# Patient Record
Sex: FEMALE | Race: WHITE | NOT HISPANIC OR LATINO | Employment: OTHER | URBAN - METROPOLITAN AREA
[De-identification: names, ages, dates, MRNs, and addresses within clinical notes are randomized per-mention and may not be internally consistent; named-entity substitution may affect disease eponyms.]

---

## 2017-01-11 ENCOUNTER — OFFICE VISIT - HEALTHEAST (OUTPATIENT)
Dept: RHEUMATOLOGY | Facility: CLINIC | Age: 77
End: 2017-01-11

## 2017-01-11 DIAGNOSIS — M15.9 GENERALIZED OSTEOARTHROSIS OF HAND: ICD-10-CM

## 2017-01-11 DIAGNOSIS — M25.50 PAIN IN JOINT, MULTIPLE SITES: ICD-10-CM

## 2017-01-11 DIAGNOSIS — M47.817 LUMBOSACRAL SPONDYLOSIS WITHOUT MYELOPATHY: ICD-10-CM

## 2017-05-11 ENCOUNTER — COMMUNICATION - HEALTHEAST (OUTPATIENT)
Dept: RHEUMATOLOGY | Facility: CLINIC | Age: 77
End: 2017-05-11

## 2017-05-11 DIAGNOSIS — M25.50 PAIN IN JOINT, MULTIPLE SITES: ICD-10-CM

## 2017-05-11 DIAGNOSIS — M47.817 LUMBOSACRAL SPONDYLOSIS WITHOUT MYELOPATHY: ICD-10-CM

## 2017-08-10 ENCOUNTER — COMMUNICATION - HEALTHEAST (OUTPATIENT)
Dept: RHEUMATOLOGY | Facility: CLINIC | Age: 77
End: 2017-08-10

## 2017-08-10 DIAGNOSIS — M25.50 PAIN IN JOINT, MULTIPLE SITES: ICD-10-CM

## 2017-08-10 DIAGNOSIS — M47.817 LUMBOSACRAL SPONDYLOSIS WITHOUT MYELOPATHY: ICD-10-CM

## 2017-09-01 ENCOUNTER — OFFICE VISIT - HEALTHEAST (OUTPATIENT)
Dept: RHEUMATOLOGY | Facility: CLINIC | Age: 77
End: 2017-09-01

## 2017-09-01 DIAGNOSIS — M15.9 GENERALIZED OSTEOARTHROSIS OF HAND: ICD-10-CM

## 2017-09-01 DIAGNOSIS — M47.817 LUMBOSACRAL SPONDYLOSIS WITHOUT MYELOPATHY: ICD-10-CM

## 2017-09-01 DIAGNOSIS — M25.50 PAIN IN JOINT, MULTIPLE SITES: ICD-10-CM

## 2017-09-01 ASSESSMENT — MIFFLIN-ST. JEOR: SCORE: 1150.91

## 2017-10-04 ENCOUNTER — RECORDS - HEALTHEAST (OUTPATIENT)
Dept: LAB | Facility: CLINIC | Age: 77
End: 2017-10-04

## 2017-10-04 LAB
CHOLEST SERPL-MCNC: 123 MG/DL
FASTING STATUS PATIENT QL REPORTED: ABNORMAL
HDLC SERPL-MCNC: 49 MG/DL
LDLC SERPL CALC-MCNC: 55 MG/DL
TRIGL SERPL-MCNC: 93 MG/DL

## 2017-12-20 ENCOUNTER — COMMUNICATION - HEALTHEAST (OUTPATIENT)
Dept: RHEUMATOLOGY | Facility: CLINIC | Age: 77
End: 2017-12-20

## 2017-12-20 DIAGNOSIS — M47.817 LUMBOSACRAL SPONDYLOSIS WITHOUT MYELOPATHY: ICD-10-CM

## 2017-12-20 DIAGNOSIS — M25.50 PAIN IN JOINT, MULTIPLE SITES: ICD-10-CM

## 2018-03-01 ENCOUNTER — OFFICE VISIT - HEALTHEAST (OUTPATIENT)
Dept: RHEUMATOLOGY | Facility: CLINIC | Age: 78
End: 2018-03-01

## 2018-03-01 DIAGNOSIS — M47.817 LUMBOSACRAL SPONDYLOSIS WITHOUT MYELOPATHY: ICD-10-CM

## 2018-03-01 DIAGNOSIS — M25.50 PAIN IN JOINT, MULTIPLE SITES: ICD-10-CM

## 2018-03-01 DIAGNOSIS — M15.9 GENERALIZED OSTEOARTHROSIS OF HAND: ICD-10-CM

## 2018-03-01 ASSESSMENT — MIFFLIN-ST. JEOR: SCORE: 1150.91

## 2018-05-29 ENCOUNTER — COMMUNICATION - HEALTHEAST (OUTPATIENT)
Dept: RHEUMATOLOGY | Facility: CLINIC | Age: 78
End: 2018-05-29

## 2018-05-29 DIAGNOSIS — M47.817 LUMBOSACRAL SPONDYLOSIS WITHOUT MYELOPATHY: ICD-10-CM

## 2018-05-29 DIAGNOSIS — M25.50 PAIN IN JOINT, MULTIPLE SITES: ICD-10-CM

## 2018-09-10 ENCOUNTER — RECORDS - HEALTHEAST (OUTPATIENT)
Dept: LAB | Facility: CLINIC | Age: 78
End: 2018-09-10

## 2018-09-11 LAB — BACTERIA SPEC CULT: NO GROWTH

## 2018-11-26 ENCOUNTER — OFFICE VISIT - HEALTHEAST (OUTPATIENT)
Dept: RHEUMATOLOGY | Facility: CLINIC | Age: 78
End: 2018-11-26

## 2018-11-26 DIAGNOSIS — M25.50 PAIN IN JOINT, MULTIPLE SITES: ICD-10-CM

## 2018-11-26 DIAGNOSIS — M47.817 LUMBOSACRAL SPONDYLOSIS WITHOUT MYELOPATHY: ICD-10-CM

## 2018-11-26 DIAGNOSIS — M15.9 GENERALIZED OSTEOARTHROSIS OF HAND: ICD-10-CM

## 2018-11-26 LAB
ALBUMIN SERPL-MCNC: 3.4 G/DL (ref 3.5–5)
ALT SERPL W P-5'-P-CCNC: 20 U/L (ref 0–45)
CREAT SERPL-MCNC: 0.6 MG/DL (ref 0.6–1.1)
ERYTHROCYTE [DISTWIDTH] IN BLOOD BY AUTOMATED COUNT: 10.8 % (ref 11–14.5)
GFR SERPL CREATININE-BSD FRML MDRD: >60 ML/MIN/1.73M2
HCT VFR BLD AUTO: 43.4 % (ref 35–47)
HGB BLD-MCNC: 14.9 G/DL (ref 12–16)
MCH RBC QN AUTO: 33 PG (ref 27–34)
MCHC RBC AUTO-ENTMCNC: 34.4 G/DL (ref 32–36)
MCV RBC AUTO: 96 FL (ref 80–100)
PLATELET # BLD AUTO: 260 THOU/UL (ref 140–440)
PMV BLD AUTO: 7 FL (ref 7–10)
RBC # BLD AUTO: 4.52 MILL/UL (ref 3.8–5.4)
WBC: 7.2 THOU/UL (ref 4–11)

## 2019-03-06 ENCOUNTER — RECORDS - HEALTHEAST (OUTPATIENT)
Dept: LAB | Facility: CLINIC | Age: 79
End: 2019-03-06

## 2019-03-06 LAB
ANION GAP SERPL CALCULATED.3IONS-SCNC: 8 MMOL/L (ref 5–18)
BUN SERPL-MCNC: 13 MG/DL (ref 8–28)
CALCIUM SERPL-MCNC: 10.4 MG/DL (ref 8.5–10.5)
CHLORIDE BLD-SCNC: 101 MMOL/L (ref 98–107)
CHOLEST SERPL-MCNC: 132 MG/DL
CO2 SERPL-SCNC: 28 MMOL/L (ref 22–31)
CREAT SERPL-MCNC: 0.64 MG/DL (ref 0.6–1.1)
FASTING STATUS PATIENT QL REPORTED: NORMAL
GFR SERPL CREATININE-BSD FRML MDRD: >60 ML/MIN/1.73M2
GLUCOSE BLD-MCNC: 77 MG/DL (ref 70–125)
HDLC SERPL-MCNC: 56 MG/DL
LDLC SERPL CALC-MCNC: 53 MG/DL
POTASSIUM BLD-SCNC: 4.7 MMOL/L (ref 3.5–5)
SODIUM SERPL-SCNC: 137 MMOL/L (ref 136–145)
TRIGL SERPL-MCNC: 115 MG/DL

## 2019-05-10 ENCOUNTER — COMMUNICATION - HEALTHEAST (OUTPATIENT)
Dept: RHEUMATOLOGY | Facility: CLINIC | Age: 79
End: 2019-05-10

## 2019-05-10 DIAGNOSIS — M47.817 LUMBOSACRAL SPONDYLOSIS WITHOUT MYELOPATHY: ICD-10-CM

## 2019-05-10 DIAGNOSIS — M25.50 PAIN IN JOINT, MULTIPLE SITES: ICD-10-CM

## 2019-05-28 ENCOUNTER — OFFICE VISIT - HEALTHEAST (OUTPATIENT)
Dept: RHEUMATOLOGY | Facility: CLINIC | Age: 79
End: 2019-05-28

## 2019-05-28 DIAGNOSIS — M25.50 PAIN IN JOINT, MULTIPLE SITES: ICD-10-CM

## 2019-05-28 DIAGNOSIS — M47.817 LUMBOSACRAL SPONDYLOSIS WITHOUT MYELOPATHY: ICD-10-CM

## 2019-05-28 LAB
ALBUMIN SERPL-MCNC: 3.6 G/DL (ref 3.5–5)
ALT SERPL W P-5'-P-CCNC: 23 U/L (ref 0–45)
CREAT SERPL-MCNC: 0.64 MG/DL (ref 0.6–1.1)
ERYTHROCYTE [DISTWIDTH] IN BLOOD BY AUTOMATED COUNT: 10.3 % (ref 11–14.5)
GFR SERPL CREATININE-BSD FRML MDRD: >60 ML/MIN/1.73M2
HCT VFR BLD AUTO: 43.4 % (ref 35–47)
HGB BLD-MCNC: 15 G/DL (ref 12–16)
MCH RBC QN AUTO: 33.6 PG (ref 27–34)
MCHC RBC AUTO-ENTMCNC: 34.5 G/DL (ref 32–36)
MCV RBC AUTO: 97 FL (ref 80–100)
PLATELET # BLD AUTO: 272 THOU/UL (ref 140–440)
PMV BLD AUTO: 7.8 FL (ref 7–10)
RBC # BLD AUTO: 4.45 MILL/UL (ref 3.8–5.4)
WBC: 7.1 THOU/UL (ref 4–11)

## 2019-05-28 ASSESSMENT — MIFFLIN-ST. JEOR: SCORE: 1127.33

## 2019-08-12 ENCOUNTER — COMMUNICATION - HEALTHEAST (OUTPATIENT)
Dept: RHEUMATOLOGY | Facility: CLINIC | Age: 79
End: 2019-08-12

## 2019-08-12 DIAGNOSIS — M47.817 LUMBOSACRAL SPONDYLOSIS WITHOUT MYELOPATHY: ICD-10-CM

## 2019-08-12 DIAGNOSIS — M25.50 PAIN IN JOINT, MULTIPLE SITES: ICD-10-CM

## 2019-11-06 ENCOUNTER — COMMUNICATION - HEALTHEAST (OUTPATIENT)
Dept: RHEUMATOLOGY | Facility: CLINIC | Age: 79
End: 2019-11-06

## 2019-11-06 DIAGNOSIS — M25.50 PAIN IN JOINT, MULTIPLE SITES: ICD-10-CM

## 2019-11-06 DIAGNOSIS — M47.817 LUMBOSACRAL SPONDYLOSIS WITHOUT MYELOPATHY: ICD-10-CM

## 2019-12-16 ENCOUNTER — COMMUNICATION - HEALTHEAST (OUTPATIENT)
Dept: RHEUMATOLOGY | Facility: CLINIC | Age: 79
End: 2019-12-16

## 2019-12-16 DIAGNOSIS — M47.817 LUMBOSACRAL SPONDYLOSIS WITHOUT MYELOPATHY: ICD-10-CM

## 2019-12-16 DIAGNOSIS — M25.50 PAIN IN JOINT, MULTIPLE SITES: ICD-10-CM

## 2020-01-27 ENCOUNTER — OFFICE VISIT - HEALTHEAST (OUTPATIENT)
Dept: RHEUMATOLOGY | Facility: CLINIC | Age: 80
End: 2020-01-27

## 2020-01-27 DIAGNOSIS — M47.817 LUMBOSACRAL SPONDYLOSIS WITHOUT MYELOPATHY: ICD-10-CM

## 2020-01-27 DIAGNOSIS — M25.50 PAIN IN JOINT, MULTIPLE SITES: ICD-10-CM

## 2020-01-27 LAB
CREAT SERPL-MCNC: 0.65 MG/DL (ref 0.6–1.1)
GFR SERPL CREATININE-BSD FRML MDRD: >60 ML/MIN/1.73M2

## 2020-01-27 RX ORDER — SODIUM FLUORIDE 1.1 G/100G
GEL ORAL
Refills: 3 | Status: SHIPPED | COMMUNITY
Start: 2019-11-26

## 2020-01-27 RX ORDER — LOSARTAN POTASSIUM 25 MG/1
TABLET ORAL
Status: SHIPPED | COMMUNITY
Start: 2020-01-11 | End: 2024-03-19

## 2020-01-27 RX ORDER — MOMETASONE FUROATE MONOHYDRATE 50 UG/1
SPRAY, METERED NASAL
Status: SHIPPED | COMMUNITY
Start: 2020-01-07

## 2020-03-19 ENCOUNTER — RECORDS - HEALTHEAST (OUTPATIENT)
Dept: LAB | Facility: CLINIC | Age: 80
End: 2020-03-19

## 2020-03-19 LAB
ANION GAP SERPL CALCULATED.3IONS-SCNC: 8 MMOL/L (ref 5–18)
BUN SERPL-MCNC: 14 MG/DL (ref 8–28)
CALCIUM SERPL-MCNC: 9.4 MG/DL (ref 8.5–10.5)
CHLORIDE BLD-SCNC: 98 MMOL/L (ref 98–107)
CO2 SERPL-SCNC: 29 MMOL/L (ref 22–31)
CREAT SERPL-MCNC: 0.68 MG/DL (ref 0.6–1.1)
GFR SERPL CREATININE-BSD FRML MDRD: >60 ML/MIN/1.73M2
GLUCOSE BLD-MCNC: 58 MG/DL (ref 70–125)
POTASSIUM BLD-SCNC: 4.4 MMOL/L (ref 3.5–5)
SODIUM SERPL-SCNC: 135 MMOL/L (ref 136–145)

## 2020-05-05 ENCOUNTER — RECORDS - HEALTHEAST (OUTPATIENT)
Dept: LAB | Facility: CLINIC | Age: 80
End: 2020-05-05

## 2020-05-05 LAB
ANION GAP SERPL CALCULATED.3IONS-SCNC: 10 MMOL/L (ref 5–18)
BUN SERPL-MCNC: 15 MG/DL (ref 8–28)
CALCIUM SERPL-MCNC: 9.6 MG/DL (ref 8.5–10.5)
CHLORIDE BLD-SCNC: 98 MMOL/L (ref 98–107)
CO2 SERPL-SCNC: 29 MMOL/L (ref 22–31)
CREAT SERPL-MCNC: 0.69 MG/DL (ref 0.6–1.1)
GFR SERPL CREATININE-BSD FRML MDRD: >60 ML/MIN/1.73M2
GLUCOSE BLD-MCNC: 63 MG/DL (ref 70–125)
POTASSIUM BLD-SCNC: 4.3 MMOL/L (ref 3.5–5)
SODIUM SERPL-SCNC: 137 MMOL/L (ref 136–145)

## 2020-05-06 LAB
CHOLEST SERPL-MCNC: 136 MG/DL
FASTING STATUS PATIENT QL REPORTED: ABNORMAL
HDLC SERPL-MCNC: 49 MG/DL
LDLC SERPL CALC-MCNC: 63 MG/DL
TRIGL SERPL-MCNC: 118 MG/DL

## 2020-08-10 ENCOUNTER — COMMUNICATION - HEALTHEAST (OUTPATIENT)
Dept: RHEUMATOLOGY | Facility: CLINIC | Age: 80
End: 2020-08-10

## 2020-08-10 DIAGNOSIS — M25.50 PAIN IN JOINT, MULTIPLE SITES: ICD-10-CM

## 2020-08-10 DIAGNOSIS — M47.817 LUMBOSACRAL SPONDYLOSIS WITHOUT MYELOPATHY: ICD-10-CM

## 2020-08-26 ENCOUNTER — OFFICE VISIT - HEALTHEAST (OUTPATIENT)
Dept: RHEUMATOLOGY | Facility: CLINIC | Age: 80
End: 2020-08-26

## 2020-08-26 DIAGNOSIS — M47.817 LUMBOSACRAL SPONDYLOSIS WITHOUT MYELOPATHY: ICD-10-CM

## 2020-08-26 DIAGNOSIS — M25.50 PAIN IN JOINT, MULTIPLE SITES: ICD-10-CM

## 2020-08-26 DIAGNOSIS — M15.9 GENERALIZED OSTEOARTHROSIS OF HAND: ICD-10-CM

## 2020-09-04 ENCOUNTER — AMBULATORY - HEALTHEAST (OUTPATIENT)
Dept: LAB | Facility: CLINIC | Age: 80
End: 2020-09-04

## 2020-09-04 DIAGNOSIS — M25.50 PAIN IN JOINT, MULTIPLE SITES: ICD-10-CM

## 2020-09-04 LAB
ALBUMIN SERPL-MCNC: 3.5 G/DL (ref 3.5–5)
ALT SERPL W P-5'-P-CCNC: 22 U/L (ref 0–45)
CREAT SERPL-MCNC: 0.69 MG/DL (ref 0.6–1.1)
ERYTHROCYTE [DISTWIDTH] IN BLOOD BY AUTOMATED COUNT: 9.9 % (ref 11–14.5)
GFR SERPL CREATININE-BSD FRML MDRD: >60 ML/MIN/1.73M2
HCT VFR BLD AUTO: 44.1 % (ref 35–47)
HGB BLD-MCNC: 15 G/DL (ref 12–16)
MCH RBC QN AUTO: 32.9 PG (ref 27–34)
MCHC RBC AUTO-ENTMCNC: 34 G/DL (ref 32–36)
MCV RBC AUTO: 97 FL (ref 80–100)
PLATELET # BLD AUTO: 312 THOU/UL (ref 140–440)
PMV BLD AUTO: 7.3 FL (ref 7–10)
RBC # BLD AUTO: 4.56 MILL/UL (ref 3.8–5.4)
WBC: 9.2 THOU/UL (ref 4–11)

## 2020-11-09 ENCOUNTER — COMMUNICATION - HEALTHEAST (OUTPATIENT)
Dept: RHEUMATOLOGY | Facility: CLINIC | Age: 80
End: 2020-11-09

## 2020-11-09 DIAGNOSIS — M25.50 PAIN IN JOINT, MULTIPLE SITES: ICD-10-CM

## 2020-11-09 DIAGNOSIS — M47.817 LUMBOSACRAL SPONDYLOSIS WITHOUT MYELOPATHY: ICD-10-CM

## 2021-02-06 ENCOUNTER — COMMUNICATION - HEALTHEAST (OUTPATIENT)
Dept: RHEUMATOLOGY | Facility: CLINIC | Age: 81
End: 2021-02-06

## 2021-02-06 DIAGNOSIS — M25.50 PAIN IN JOINT, MULTIPLE SITES: ICD-10-CM

## 2021-02-06 DIAGNOSIS — M47.817 LUMBOSACRAL SPONDYLOSIS WITHOUT MYELOPATHY: ICD-10-CM

## 2021-02-23 ENCOUNTER — OFFICE VISIT - HEALTHEAST (OUTPATIENT)
Dept: RHEUMATOLOGY | Facility: CLINIC | Age: 81
End: 2021-02-23

## 2021-02-23 DIAGNOSIS — M47.817 LUMBOSACRAL SPONDYLOSIS WITHOUT MYELOPATHY: ICD-10-CM

## 2021-02-23 DIAGNOSIS — M25.50 POLYARTHRALGIA: ICD-10-CM

## 2021-02-23 DIAGNOSIS — M15.9 GENERALIZED OSTEOARTHROSIS OF HAND: ICD-10-CM

## 2021-04-21 ENCOUNTER — RECORDS - HEALTHEAST (OUTPATIENT)
Dept: LAB | Facility: CLINIC | Age: 81
End: 2021-04-21

## 2021-04-21 LAB
CHOLEST SERPL-MCNC: 128 MG/DL
FASTING STATUS PATIENT QL REPORTED: NORMAL
HDLC SERPL-MCNC: 53 MG/DL
LDLC SERPL CALC-MCNC: 48 MG/DL
TRIGL SERPL-MCNC: 133 MG/DL
TSH SERPL DL<=0.005 MIU/L-ACNC: 1.18 UIU/ML (ref 0.3–5)

## 2021-04-29 ENCOUNTER — RECORDS - HEALTHEAST (OUTPATIENT)
Dept: RHEUMATOLOGY | Facility: CLINIC | Age: 81
End: 2021-04-29

## 2021-05-19 ENCOUNTER — RECORDS - HEALTHEAST (OUTPATIENT)
Dept: LAB | Facility: CLINIC | Age: 81
End: 2021-05-19

## 2021-05-19 LAB
ANION GAP SERPL CALCULATED.3IONS-SCNC: 12 MMOL/L (ref 5–18)
BUN SERPL-MCNC: 14 MG/DL (ref 8–28)
CALCIUM SERPL-MCNC: 8.7 MG/DL (ref 8.5–10.5)
CHLORIDE BLD-SCNC: 99 MMOL/L (ref 98–107)
CO2 SERPL-SCNC: 25 MMOL/L (ref 22–31)
CREAT SERPL-MCNC: 0.67 MG/DL (ref 0.6–1.1)
GFR SERPL CREATININE-BSD FRML MDRD: >60 ML/MIN/1.73M2
GLUCOSE BLD-MCNC: 84 MG/DL (ref 70–125)
POTASSIUM BLD-SCNC: 4.3 MMOL/L (ref 3.5–5)
SODIUM SERPL-SCNC: 136 MMOL/L (ref 136–145)

## 2021-05-20 LAB — 25(OH)D3 SERPL-MCNC: 66.4 NG/ML (ref 30–80)

## 2021-05-24 ENCOUNTER — OFFICE VISIT - HEALTHEAST (OUTPATIENT)
Dept: RHEUMATOLOGY | Facility: CLINIC | Age: 81
End: 2021-05-24

## 2021-05-24 DIAGNOSIS — M15.9 GENERALIZED OSTEOARTHROSIS OF HAND: ICD-10-CM

## 2021-05-24 DIAGNOSIS — M25.50 PAIN IN JOINT, MULTIPLE SITES: ICD-10-CM

## 2021-05-24 DIAGNOSIS — M47.817 LUMBOSACRAL SPONDYLOSIS WITHOUT MYELOPATHY: ICD-10-CM

## 2021-05-24 DIAGNOSIS — M76.892 ENTHESOPATHY OF HIP REGION ON BOTH SIDES: ICD-10-CM

## 2021-05-24 DIAGNOSIS — M76.891 ENTHESOPATHY OF HIP REGION ON BOTH SIDES: ICD-10-CM

## 2021-05-24 RX ORDER — GABAPENTIN 300 MG/1
300 CAPSULE ORAL AT BEDTIME
Qty: 90 CAPSULE | Refills: 0 | Status: SHIPPED | OUTPATIENT
Start: 2021-05-24 | End: 2021-11-08

## 2021-05-29 ENCOUNTER — RECORDS - HEALTHEAST (OUTPATIENT)
Dept: ADMINISTRATIVE | Facility: CLINIC | Age: 81
End: 2021-05-29

## 2021-05-29 NOTE — PROGRESS NOTES
ASSESSMENT AND PLAN  Ginna Pagan 78 y.o. female  is here for follow-up.  She has widespread osteoarthritis, lumbar spondylosis, contributing to polyarthralgias, back pain.  She is doing well with gabapentin at 300 mg at nighttime.  Due for her labs.  She takes Tylenol couple of times in the morning and in the evening.  She is now moved to a senior, she lost her  of 32 years secondary to recurrence of lung cancer.  She is to return for follow-up in 6 months or sooner.    Diagnoses and all orders for this visit:    Lumbar Spondylosis  -     gabapentin (NEURONTIN) 300 MG capsule; Take 1 capsule (300 mg) by mouth once daily.  Dispense: 90 capsule; Refill: 0    Arthralgias In Multiple Sites  -     ALT (SGPT)  -     Albumin  -     Creatinine  -     HM2(CBC w/o Differential)  -     gabapentin (NEURONTIN) 300 MG capsule; Take 1 capsule (300 mg) by mouth once daily.  Dispense: 90 capsule; Refill: 0          HISTORY OF PRESENTING ILLNESS:  Ginna Pagan, 78 y.o., female is here for follow-up.  She has a polyarthralgias in association with degenerative joint disease both of the appendical as well as the axial system.  She is noted pain level to be at 1.5/10, mild discomfort in multiple joint areas, takes Tylenol 2 tablets twice daily, gabapentin 3 mg at nighttime.  She feels that this combination provides her with sufficient relief.  She remains active able to do all her day-to-day activities without difficulty.  She has just recently moved to a senior living complex.   She has had morning stiffness no more than 30 minutes.  She noted to know fever weight loss blurry vision eye redness mouth ulcer nausea cough there is no rash.  As noted before her  passed away in May 2017.  They were  for 32 years.  He had developed lung cancer and coronary artery disease and after the surgeries passed away within a month or so.  Her symptoms are helped by gabapentin. Previous treatments include steroid  injection, which helped her for nearly 3 months. Patient denies associated alopecia, fevers, new headache, nodules, rashes/photosensitive and Raynaud's.   Further historical information, including ROS and ADL limitations as noted in the multidimensional health assessment questionnaire scanned in the EMR and in the assessment and plan section.     ALLERGIES:Clindamycin; Dilaudid [hydromorphone]; Morphine; Other allergy (see comments); and Sulfa (sulfonamide antibiotics)    PAST MEDICAL/ACTIVE PROBLEMS/MEDICATION/SOCIAL DATA  Past Medical History:   Diagnosis Date     Arthritis      Hypercholesteremia      Hypertension      Social History     Tobacco Use   Smoking Status Former Smoker     Years: 30.00   Smokeless Tobacco Never Used   Tobacco Comment    1990's     Patient Active Problem List   Diagnosis     Arthralgias In Multiple Sites     Myalgia And Myositis     Generalized Osteoarthritis Of The Hand     Lumbar Spondylosis     Trochanteric Bursitis     Chest pain     HTN (hypertension)     Lipid disorder     SANTOS (dyspnea on exertion)     Diastolic dysfunction     Ventricular hypokinesia     Current Outpatient Medications   Medication Sig Dispense Refill     amLODIPine (NORVASC) 5 MG tablet Take 5 mg by mouth daily.       aspirin 81 MG EC tablet Take 81 mg by mouth daily.       atenolol (TENORMIN) 100 MG tablet Take 100 mg by mouth daily.       atorvastatin (LIPITOR) 20 MG tablet Take 20 mg by mouth bedtime.       calcium, as carbonate, (OS-MYA) 500 mg calcium (1,250 mg) tablet Take 2 tablets by mouth daily.       estradiol (CLIMARA) 0.0375 mg/24 hr   0     fluticasone (FLONASE) 50 mcg/actuation nasal spray 1 spray into each nostril daily.       gabapentin (NEURONTIN) 300 MG capsule Take 1 capsule (300 mg) by mouth once daily. 90 capsule 0     GLUCOSAM HCL/CHONDRO SOLORIO A/C/MN (GLUCOSAMINE-CHONDROITIN COMPLX ORAL) Take 2 capsules by mouth daily.       triamterene-hydrochlorothiazide (DYAZIDE) 37.5-25 mg per capsule Take  "1 capsule by mouth 3 (three) times a week. Mon, Wed, and Fri       No current facility-administered medications for this visit.      DETAILED EXAMINATION  05/28/19  :  Vitals:    05/28/19 1041   BP: 110/60   Pulse: (!) 56   Weight: 147 lb 12.8 oz (67 kg)   Height: 5' 4.75\" (1.645 m)     Alert oriented. Head including the face is examined for malar rash, heliotropes, scarring, lupus pernio. Eyes examined for redness such as in episcleritis/scleritis, periorbital lesions.   Neck/ Face examined for parotid gland swelling, range of motion of neck.  Left upper and lower and right upper and lower extremities examined for tenderness, swelling, warmth of the appendicular joints, range of motion, edema, rash.  Some of the important findings included: She does not have evidence of synovitis in any of the palpable joints of the upper extremities.  No significant deformities of the digits.  No JLT effusion or warmth of the knees.  Hypertrophy of the first CMC's, worse on the left with minimal crepitus.        LAB / IMAGING DATA:  ALT   Date Value Ref Range Status   11/26/2018 20 0 - 45 U/L Final   08/11/2014 25 0 - 45 U/L Final     Albumin   Date Value Ref Range Status   11/26/2018 3.4 (L) 3.5 - 5.0 g/dL Final   08/11/2014 3.7 3.5 - 5.0 g/dL Final     Creatinine   Date Value Ref Range Status   03/06/2019 0.64 0.60 - 1.10 mg/dL Final   11/26/2018 0.60 0.60 - 1.10 mg/dL Final   10/04/2017 0.65 0.60 - 1.10 mg/dL Final       WBC   Date Value Ref Range Status   11/26/2018 7.2 4.0 - 11.0 thou/uL Final   02/27/2015 11.7 (H) 4.0 - 11.0 thou/uL Final       Lab Results   Component Value Date    RF <15.0 07/29/2014    SEDRATE 23 (H) 08/11/2014     "

## 2021-05-30 ENCOUNTER — RECORDS - HEALTHEAST (OUTPATIENT)
Dept: ADMINISTRATIVE | Facility: CLINIC | Age: 81
End: 2021-05-30

## 2021-05-30 VITALS — WEIGHT: 154.2 LBS | BODY MASS INDEX: 25.86 KG/M2

## 2021-05-31 ENCOUNTER — RECORDS - HEALTHEAST (OUTPATIENT)
Dept: ADMINISTRATIVE | Facility: CLINIC | Age: 81
End: 2021-05-31

## 2021-05-31 VITALS — HEIGHT: 65 IN | BODY MASS INDEX: 25.49 KG/M2 | WEIGHT: 153 LBS

## 2021-05-31 VITALS — WEIGHT: 153 LBS | HEIGHT: 65 IN | BODY MASS INDEX: 25.49 KG/M2

## 2021-06-02 VITALS — HEIGHT: 65 IN | WEIGHT: 147.8 LBS | BODY MASS INDEX: 24.62 KG/M2

## 2021-06-02 VITALS — WEIGHT: 146 LBS | BODY MASS INDEX: 24.48 KG/M2

## 2021-06-04 VITALS
SYSTOLIC BLOOD PRESSURE: 160 MMHG | DIASTOLIC BLOOD PRESSURE: 76 MMHG | WEIGHT: 148 LBS | HEART RATE: 64 BPM | BODY MASS INDEX: 24.82 KG/M2

## 2021-06-05 VITALS
WEIGHT: 148 LBS | SYSTOLIC BLOOD PRESSURE: 160 MMHG | DIASTOLIC BLOOD PRESSURE: 80 MMHG | HEART RATE: 78 BPM | BODY MASS INDEX: 24.82 KG/M2

## 2021-06-05 NOTE — PROGRESS NOTES
ASSESSMENT AND PLAN  Ginna Pagan 79 y.o. female  is here for follow-up of polyarthralgias in association with osteoarthritis doing great with gabapentin, acetaminophen, continue the current combination, major side effects reviewed, check her blood pressure before she leaves, check creatinine today.  Follow-up in 6 months.  Should the blood pressure stay elevated she will check with the primary physician.  .    Diagnoses and all orders for this visit:    Lumbar Spondylosis  -     gabapentin (NEURONTIN) 300 MG capsule; Take 1 capsule (300 mg total) by mouth at bedtime.  Dispense: 90 capsule; Refill: 1  -     Creatinine    Arthralgias In Multiple Sites  -     gabapentin (NEURONTIN) 300 MG capsule; Take 1 capsule (300 mg total) by mouth at bedtime.  Dispense: 90 capsule; Refill: 1  -     Creatinine          HISTORY OF PRESENTING ILLNESS:  Ginna Pagan, 79 y.o., female is here for follow-up.  She has a polyarthralgias in association with degenerative joint disease both of the appendical as well as the axial system.  She has noted pain level of 2.0/10 predominantly in her hands bases of the thumbs worse on the left side.  She is finding gabapentin helpful.  She takes it at bedtime.  She is able do all her day-to-day activities without difficulty.  She has not had sustained stiffness in the morning.  She takes acetaminophen in addition to gabapentin. She feels that this combination provides her with sufficient relief.  She remains active able to do all her day-to-day activities without difficulty.  She has just recently moved to a senior living complex.   She has had morning stiffness no more than 30 minutes.  She noted to know fever weight loss blurry vision eye redness mouth ulcer nausea cough there is no rash.  As noted before her  passed away in May 2017.  They were  for 32 years.  He had developed lung cancer and coronary artery disease and after the surgeries passed away within a month or so.   Her symptoms are helped by gabapentin. Previous treatments include steroid injection, which helped her for nearly 3 months. Patient denies associated alopecia, fevers, new headache, nodules, rashes/photosensitive and Raynaud's.   Further historical information, including ROS and ADL limitations as noted in the multidimensional health assessment questionnaire scanned in the EMR and in the assessment and plan section.     ALLERGIES:Clindamycin; Dilaudid [hydromorphone]; Morphine; Other allergy (see comments); and Sulfa (sulfonamide antibiotics)    PAST MEDICAL/ACTIVE PROBLEMS/MEDICATION/SOCIAL DATA  Past Medical History:   Diagnosis Date     Arthritis      Hypercholesteremia      Hypertension      Social History     Tobacco Use   Smoking Status Former Smoker     Years: 30.00   Smokeless Tobacco Never Used   Tobacco Comment    1990's     Patient Active Problem List   Diagnosis     Arthralgias In Multiple Sites     Myalgia And Myositis     Generalized Osteoarthritis Of The Hand     Lumbar Spondylosis     Trochanteric Bursitis     Chest pain     HTN (hypertension)     Lipid disorder     SANTOS (dyspnea on exertion)     Diastolic dysfunction     Ventricular hypokinesia     Current Outpatient Medications   Medication Sig Dispense Refill     amLODIPine (NORVASC) 5 MG tablet Take 5 mg by mouth daily.       aspirin 81 MG EC tablet Take 81 mg by mouth daily.       atenolol (TENORMIN) 100 MG tablet Take 100 mg by mouth daily.       atorvastatin (LIPITOR) 20 MG tablet Take 20 mg by mouth bedtime.       calcium, as carbonate, (OS-MYA) 500 mg calcium (1,250 mg) tablet Take 2 tablets by mouth daily.       gabapentin (NEURONTIN) 300 MG capsule TAKE 1 CAPSULE (300 MG) BY MOUTH ONCE DAILY. 30 capsule 0     GLUCOSAM HCL/CHONDRO SOLORIO A/C/MN (GLUCOSAMINE-CHONDROITIN COMPLX ORAL) Take 2 capsules by mouth daily.       triamterene-hydrochlorothiazide (DYAZIDE) 37.5-25 mg per capsule Take 1 capsule by mouth 3 (three) times a week. Mon, Wed, and  Fri       DENTAGEL 1.1 % Gel dental gel BRUSTH MOUTH WITH PASTE THREE TIMES A DAY  3     fluticasone (FLONASE) 50 mcg/actuation nasal spray 1 spray into each nostril daily.       losartan (COZAAR) 25 MG tablet TAKE ONE OR TWO TABLETS BY MOUTH DAILY       mometasone (NASONEX) 50 mcg/actuation nasal spray use 2 sprays in each nostril once a day       No current facility-administered medications for this visit.      DETAILED EXAMINATION  01/27/20  :  Vitals:    01/27/20 1037   BP: 160/76   Patient Site: Right Arm   Patient Position: Sitting   Cuff Size: Adult Regular   Pulse: 64   Weight: 148 lb (67.1 kg)     Alert oriented. Head including the face is examined for malar rash, heliotropes, scarring, lupus pernio. Eyes examined for redness such as in episcleritis/scleritis, periorbital lesions.   Neck/ Face examined for parotid gland swelling, range of motion of neck.  Left upper and lower and right upper and lower extremities examined for tenderness, swelling, warmth of the appendicular joints, range of motion, edema, rash.  Some of the important findings included: There is no synovitis of palpable joints of upper extremities.  Knees without effusion warmth. Left first CMC tender and crepitant with mild grinding.        LAB / IMAGING DATA:  ALT   Date Value Ref Range Status   05/28/2019 23 0 - 45 U/L Final   11/26/2018 20 0 - 45 U/L Final   08/11/2014 25 0 - 45 U/L Final     Albumin   Date Value Ref Range Status   05/28/2019 3.6 3.5 - 5.0 g/dL Final   11/26/2018 3.4 (L) 3.5 - 5.0 g/dL Final   08/11/2014 3.7 3.5 - 5.0 g/dL Final     Creatinine   Date Value Ref Range Status   05/28/2019 0.64 0.60 - 1.10 mg/dL Final   03/06/2019 0.64 0.60 - 1.10 mg/dL Final   11/26/2018 0.60 0.60 - 1.10 mg/dL Final       WBC   Date Value Ref Range Status   05/28/2019 7.1 4.0 - 11.0 thou/uL Final   11/26/2018 7.2 4.0 - 11.0 thou/uL Final   02/27/2015 11.7 (H) 4.0 - 11.0 thou/uL Final       Lab Results   Component Value Date    RF <15.0  07/29/2014    WALTER 23 (H) 08/11/2014

## 2021-06-08 NOTE — PROGRESS NOTES
ASSESSMENT AND PLAN:  Ginna Pagan 76 y.o. female  is a moderately severe exacerbation of pain in her left hand centered around the first CMC.  In the past she has had good response to local corticosteroid injections.  She's not interested in surgery at this point at least.  She has used a brace in the past.  Over-the-counter measures have not helped.  She has a option of corticosteroid injection she wants to proceed.  20 mg of methylprednisolone were given under ultrasound guidance were injected she had a Marcaine response.  Return for follow-up in 4-6 months or sooner.  For the low back discomfort for which taking gabapentin with good effectiveness to continue.  Diagnoses and all orders for this visit:    Generalized Osteoarthritis Of The Hand  -     methylPREDNISolone acetate injection 20 mg (DEPO-MEDROL); Inject 0.5 mL (20 mg total) into the joint once.    Lumbar Spondylosis  -     gabapentin (NEURONTIN) 300 MG capsule; TAKE 1 CAPSULE (300 MG) BY MOUTH 3 TIMES A DAY  Dispense: 270 capsule; Refill: 0    Arthralgias In Multiple Sites  -     gabapentin (NEURONTIN) 300 MG capsule; TAKE 1 CAPSULE (300 MG) BY MOUTH 3 TIMES A DAY  Dispense: 270 capsule; Refill: 0          HISTORY OF PRESENTING ILLNESS:  Ginna Pagan 76 y.o. is here for a moderately severe flare up of pain.  Joints affected include left 1st cmc. This has gone on for several weeks ago. Pain is described as shooting. It is continuously.  Her symptoms are moderate, severe. The symptoms are gradually worsening. Symptoms include pain, tenderness to touch, limited range of motion.  Treatment to date has been without significant relief.    Further historical information, including ROS and limitation in activities as noted in the multidimensional health assessment questionnaire scanned in the EMR and in the assessment and plan section.  She feels her back pain is somewhat better with gabapentin that she has tolerated well.    ALLERGIES:Dilaudid  [hydromorphone]; Morphine; Other allergy (see comments); and Sulfa (sulfonamide antibiotics)    PAST MEDICAL/ACTIVE PROBLEMS/MEDICATION/SOCIAL DATA  Past Medical History   Diagnosis Date     Arthritis      Hypercholesteremia      Hypertension      History   Smoking Status     Former Smoker     Years: 30.00   Smokeless Tobacco     Not on file     Comment: 1990's     Patient Active Problem List   Diagnosis     Arthralgias In Multiple Sites     Myalgia And Myositis     Generalized Osteoarthritis Of The Hand     Lumbar Spondylosis     Trochanteric Bursitis     Chest pain     HTN (hypertension)     Lipid disorder     SANTOS (dyspnea on exertion)     Diastolic dysfunction     Ventricular hypokinesia     Trigger thumb, left     Current Outpatient Prescriptions   Medication Sig Dispense Refill     amLODIPine (NORVASC) 5 MG tablet Take 5 mg by mouth daily.       aspirin 81 MG EC tablet Take 81 mg by mouth daily.       atenolol (TENORMIN) 100 MG tablet Take 100 mg by mouth daily.       atorvastatin (LIPITOR) 20 MG tablet Take 20 mg by mouth bedtime.       calcium, as carbonate, (OS-MYA) 500 mg calcium (1,250 mg) tablet Take 2 tablets by mouth daily.       fluticasone (FLONASE) 50 mcg/actuation nasal spray 1 spray into each nostril daily.       gabapentin (NEURONTIN) 300 MG capsule TAKE 1 CAPSULE (300 MG) BY MOUTH 3 TIMES A  capsule 1     GLUCOSAM HCL/CHONDRO SOLORIO A/C/MN (GLUCOSAMINE-CHONDROITIN COMPLX ORAL) Take 2 capsules by mouth daily.       triamterene-hydrochlorothiazide (DYAZIDE) 37.5-25 mg per capsule Take 1 capsule by mouth 3 (three) times a week. Mon, Wed, and Fri       Current Facility-Administered Medications   Medication Dose Route Frequency Provider Last Rate Last Dose     methylPREDNISolone acetate injection 20 mg (DEPO-MEDROL)  20 mg Intra-articular Once MARY Curry             DETAILED EXAMINATION:  Vitals:    01/11/17 1329   BP: 114/62   Patient Site: Right Arm   Patient Position: Sitting   Cuff Size:  Adult Regular   Pulse: 60   Weight: 154 lb 3.2 oz (69.9 kg)    Comfortable.  Alert oriented.  Eyes are without inflammatory changes.  Examination of both upper and lower extremities is performed for swollen & tender joints, range of motion, rash, weakness, discoloration, warmth, swelling.  The skin examined for nodules. The salient normal / abnormal findings are appended.  Left first CMC crepitus and tenderness.    LAB / IMAGING DATA:  ALT   Date Value Ref Range Status   08/11/2014 25 0 - 45 U/L Final     ALBUMIN   Date Value Ref Range Status   08/11/2014 3.7 3.5 - 5.0 g/dL Final     CREATININE   Date Value Ref Range Status   09/13/2016 0.63 0.60 - 1.10 mg/dL Final   11/17/2015 0.60 0.60 - 1.10 mg/dL Final   02/27/2015 0.72 0.60 - 1.10 mg/dL Final       WBC   Date Value Ref Range Status   02/27/2015 11.7 (H) 4.0 - 11.0 thou/uL Final     HEMOGLOBIN   Date Value Ref Range Status   02/27/2015 16.1 (H) 12.0 - 16.0 g/dL Final     PLATELETS   Date Value Ref Range Status   02/27/2015 343 140 - 440 thou/uL Final       Lab Results   Component Value Date    RF <15.0 07/29/2014    SEDRATE 23 (H) 08/11/2014

## 2021-06-10 NOTE — PROGRESS NOTES
"Ginna Pagan is a 79 y.o. female who is being evaluated via a billable telephone visit.      The patient has been notified of following:     \"This telephone visit will be conducted via a call between you and your physician/provider. We have found that certain health care needs can be provided without the need for a physical exam.  This service lets us provide the care you need with a short phone conversation.  If a prescription is necessary we can send it directly to your pharmacy.  If lab work is needed we can place an order for that and you can then stop by our lab to have the test done at a later time.    Telephone visits are billed at different rates depending on your insurance coverage. During this emergency period, for some insurers they may be billed the same as an in-person visit.  Please reach out to your insurance provider with any questions.     If during the course of the call the physician/provider feels a telephone visit is not appropriate, you will not be charged for this service.\"    Patient has given verbal consent to a Telephone visit? Yes    What phone number would you like to be contacted at? [phone]     Patient would like to receive their AVS by AVS Preference: Tyr[place].      ASSESSMENT AND PLAN:    Diagnoses and all orders for this visit:    Lumbar Spondylosis  -     gabapentin (NEURONTIN) 300 MG capsule; Take 1 capsule (300 mg total) by mouth at bedtime.  Dispense: 90 capsule; Refill: 0    Generalized Osteoarthritis Of The Hand    Arthralgias In Multiple Sites  -     gabapentin (NEURONTIN) 300 MG capsule; Take 1 capsule (300 mg total) by mouth at bedtime.  Dispense: 90 capsule; Refill: 0  -     ALT (SGPT); Future; Expected date: 09/04/2020  -     Albumin; Future; Expected date: 09/04/2020  -     Creatinine; Future; Expected date: 09/04/2020  -     HM2(CBC w/o Differential); Future; Expected date: 09/04/2020          HISTORY OF PRESENTING ILLNESS:  Ginna Pagan 79 y.o. is evaluated " here via phone link him.  She has arthralgias in association with widespread osteoarthritis.  This is both affected her axial system as well as appendicular.  She has noted gabapentin is continued to provide her with good relief.  She takes it at bedtime.  Recently she has more discomfort in the lower back.  She is been taking Tylenol that is been helpful.  She is due for labs.  She reports no significant exacerbation of her symptoms over the past several months.  She is comfortably in her senior living complex.  She noted no stiffness in the morning beyond the first few minutes. She noted to know fever weight loss blurry vision eye redness mouth ulcer nausea cough there is no rash.  As noted before her  passed away in May 2017.  They were  for 32 years.  He had developed lung cancer and coronary artery disease and after the surgeries passed away within a month or so.  Her symptoms are helped by gabapentin. Previous treatments include steroid injection, which helped her for nearly 3 months. Patient denies associated alopecia, fevers, new headache, nodules, rashes/photosensitive and Raynaud's.  . ROS enquiry held for fever, ocular symptoms, rash, headache,  GI issues.  Today we also discussed the issues related to the current pandemic, the pros and cons of the current treatment plan, the CDC guidelines such as social distancing washing the hands covering the cough.  ALLERGIES:Clindamycin; Dilaudid [hydromorphone]; Morphine; Other allergy (see comments); and Sulfa (sulfonamide antibiotics)    PAST MEDICAL/ACTIVE PROBLEMS/MEDICATION/SOCIAL DATA  Past Medical History:   Diagnosis Date     Arthritis      Hypercholesteremia      Hypertension      Social History     Tobacco Use   Smoking Status Former Smoker     Years: 30.00   Smokeless Tobacco Never Used   Tobacco Comment    1990's     Patient Active Problem List   Diagnosis     Arthralgias In Multiple Sites     Myalgia And Myositis     Generalized  Osteoarthritis Of The Hand     Lumbar Spondylosis     Trochanteric Bursitis     Chest pain     HTN (hypertension)     Lipid disorder     SANTOS (dyspnea on exertion)     Diastolic dysfunction     Ventricular hypokinesia     Current Outpatient Medications   Medication Sig Dispense Refill     amLODIPine (NORVASC) 5 MG tablet Take 5 mg by mouth daily.       aspirin 81 MG EC tablet Take 81 mg by mouth daily.       atenolol (TENORMIN) 100 MG tablet Take 100 mg by mouth daily.       atorvastatin (LIPITOR) 20 MG tablet Take 20 mg by mouth bedtime.       calcium, as carbonate, (OS-MYA) 500 mg calcium (1,250 mg) tablet Take 2 tablets by mouth daily.       DENTAGEL 1.1 % Gel dental gel BRUSTH MOUTH WITH PASTE THREE TIMES A DAY  3     fluticasone (FLONASE) 50 mcg/actuation nasal spray 1 spray into each nostril daily.       gabapentin (NEURONTIN) 300 MG capsule Take 1 capsule (300 mg total) by mouth at bedtime. 90 capsule 0     GLUCOSAM HCL/CHONDRO SOLORIO A/C/MN (GLUCOSAMINE-CHONDROITIN COMPLX ORAL) Take 2 capsules by mouth daily.       losartan (COZAAR) 25 MG tablet TAKE ONE OR TWO TABLETS BY MOUTH DAILY       mometasone (NASONEX) 50 mcg/actuation nasal spray use 2 sprays in each nostril once a day       triamterene-hydrochlorothiazide (DYAZIDE) 37.5-25 mg per capsule Take 1 capsule by mouth 3 (three) times a week. Mon, Wed, and Fri       No current facility-administered medications for this visit.          EXAMINATION: She sounded comfortable, alert, oriented, her speech and memory and recall seemed intact.  She did not sound like she was in pain.  LAB / IMAGING DATA:  ALT   Date Value Ref Range Status   05/28/2019 23 0 - 45 U/L Final   11/26/2018 20 0 - 45 U/L Final   08/11/2014 25 0 - 45 U/L Final     Albumin   Date Value Ref Range Status   05/28/2019 3.6 3.5 - 5.0 g/dL Final   11/26/2018 3.4 (L) 3.5 - 5.0 g/dL Final   08/11/2014 3.7 3.5 - 5.0 g/dL Final     Creatinine   Date Value Ref Range Status   05/05/2020 0.69 0.60 - 1.10  mg/dL Final   03/19/2020 0.68 0.60 - 1.10 mg/dL Final   01/27/2020 0.65 0.60 - 1.10 mg/dL Final       WBC   Date Value Ref Range Status   05/28/2019 7.1 4.0 - 11.0 thou/uL Final   11/26/2018 7.2 4.0 - 11.0 thou/uL Final   02/27/2015 11.7 (H) 4.0 - 11.0 thou/uL Final     Hemoglobin   Date Value Ref Range Status   05/28/2019 15.0 12.0 - 16.0 g/dL Final   11/26/2018 14.9 12.0 - 16.0 g/dL Final   02/27/2015 16.1 (H) 12.0 - 16.0 g/dL Final     Platelets   Date Value Ref Range Status   05/28/2019 272 140 - 440 thou/uL Final   11/26/2018 260 140 - 440 thou/uL Final   02/27/2015 343 140 - 440 thou/uL Final       Lab Results   Component Value Date    RF <15.0 07/29/2014    SEDRATE 23 (H) 08/11/2014     Duration of the call:7  Minutes

## 2021-06-10 NOTE — TELEPHONE ENCOUNTER
Refilled per Rheum RN refill protocol  F/u appt 8/26/20    Today's advice/conversation is in the additional context of the current extreme COVID-19 pandemic circumstances.

## 2021-06-12 NOTE — PROGRESS NOTES
ASSESSMENT AND PLAN  Ginna Pagan 76 y.o. female  is here for follow-up of lumbar spondylosis and osteoarthritis of  hands and the knees.  She finds that gabapentin has provided her sufficient relief.  On her previous visit she had a left first CMC injection which continues to provide durable benefit.  With the recent weather changes and has been more discomfort.   She is going to see if gabapentin twice daily works as well for her.  Her renal function has been normal on a previous check her next labs are due in October so those will be done at primary physician's office.  I have emphasized the importance of checking her kidney function tests.  Return for follow-up in 6 months or sooner.   Diagnoses and all orders for this visit:    Lumbar Spondylosis  -     gabapentin (NEURONTIN) 300 MG capsule; take one capsule by mouth three times a day  Dispense: 270 capsule; Refill: 0    Arthralgias In Multiple Sites  -     gabapentin (NEURONTIN) 300 MG capsule; take one capsule by mouth three times a day  Dispense: 270 capsule; Refill: 0    Generalized Osteoarthritis Of The Hand          HISTORY OF PRESENTING ILLNESS:  Ginna Pagan, 76 y.o., female is here for pain in association with widespread osteoarthritis, responsive to gabapentin that she has tolerated nicely.  It is located in multiple joints and the hip(s), is described as aching and sharp, and is intermittent, gone on for a few weeks. Symptoms include pain, tenderness to touch. Onset was gradual. Her symptoms were gradually worsening. The symptoms were of moderate severity.   .  These are helped by gabapentin. Previous treatments include steroid injection, which helped her for nearly 3 months. Patient denies associated alopecia, fevers, new headache, nodules, rashes/photosensitive and Raynaud's.   Further historical information, including ROS and ADL limitations as noted in the multidimensional health assessment questionnaire scanned in the EMR and in the  assessment and plan section.  She has noted pain in her left hand.  This is epicentered around the first CMC and MCP #1 around the flexor tendon.  This is worse with activity especially when she holds onto dishes for example she rated the pain as moderately severe.  There is some difficulty performing some of the day-to-day activities.  There is there is no sustained morning stiffness.    ALLERGIES:Clindamycin; Dilaudid [hydromorphone]; Morphine; Other allergy (see comments); and Sulfa (sulfonamide antibiotics)    PAST MEDICAL/ACTIVE PROBLEMS/MEDICATION/SOCIAL DATA  Past Medical History:   Diagnosis Date     Arthritis      Hypercholesteremia      Hypertension      History   Smoking Status     Former Smoker     Years: 30.00   Smokeless Tobacco     Not on file     Comment: 1990's     Patient Active Problem List   Diagnosis     Arthralgias In Multiple Sites     Myalgia And Myositis     Generalized Osteoarthritis Of The Hand     Lumbar Spondylosis     Trochanteric Bursitis     Chest pain     HTN (hypertension)     Lipid disorder     SANTOS (dyspnea on exertion)     Diastolic dysfunction     Ventricular hypokinesia     Current Outpatient Prescriptions   Medication Sig Dispense Refill     amLODIPine (NORVASC) 5 MG tablet Take 5 mg by mouth daily.       aspirin 81 MG EC tablet Take 81 mg by mouth daily.       atenolol (TENORMIN) 100 MG tablet Take 100 mg by mouth daily.       atorvastatin (LIPITOR) 20 MG tablet Take 20 mg by mouth bedtime.       calcium, as carbonate, (OS-MYA) 500 mg calcium (1,250 mg) tablet Take 2 tablets by mouth daily.       fluticasone (FLONASE) 50 mcg/actuation nasal spray 1 spray into each nostril daily.       gabapentin (NEURONTIN) 300 MG capsule take one capsule by mouth three times a day 270 capsule 0     GLUCOSAM HCL/CHONDRO SOLORIO A/C/MN (GLUCOSAMINE-CHONDROITIN COMPLX ORAL) Take 2 capsules by mouth daily.       triamterene-hydrochlorothiazide (DYAZIDE) 37.5-25 mg per capsule Take 1 capsule by mouth 3  "(three) times a week. Mon, Wed, and Fri       No current facility-administered medications for this visit.      DETAILED EXAMINATION  09/01/17  :  Vitals:    09/01/17 1040   BP: 138/66   Weight: 153 lb (69.4 kg)   Height: 5' 4.75\" (1.645 m)     Alert oriented. Head including the face is examined for malar rash, heliotropes, scarring, lupus pernio. Eyes examined for redness such as in episcleritis/scleritis, periorbital lesions.   Neck examined  for lymph nodes, range of motion Both upper and lower extremities (all four) examined for swollen, warm &/or  tender joints, range of motion, rash, muscle weakness, edema. The salient normal / abnormal findings are appended.   She is tender in her first CMCs, left more than right  with crepitation on minimal grinding.  Mild knee JLT bilaterally.  There is no paraspinal tenderness in the lumbosacral area  .  LAB / IMAGING DATA:  ALT   Date Value Ref Range Status   08/11/2014 25 0 - 45 U/L Final     Albumin   Date Value Ref Range Status   08/11/2014 3.7 3.5 - 5.0 g/dL Final     Creatinine   Date Value Ref Range Status   09/13/2016 0.63 0.60 - 1.10 mg/dL Final   11/17/2015 0.60 0.60 - 1.10 mg/dL Final   02/27/2015 0.72 0.60 - 1.10 mg/dL Final       WBC   Date Value Ref Range Status   02/27/2015 11.7 (H) 4.0 - 11.0 thou/uL Final       Lab Results   Component Value Date    RF <15.0 07/29/2014    SEDRATE 23 (H) 08/11/2014     "

## 2021-06-15 NOTE — PROGRESS NOTES
This document was created using a software with less than 100% fidelity, at times resulting in unintended, even erroneous syntax and grammar.  The reader is advised to keep this under consideration while reviewing, interpreting this note.       ASSESSMENT AND PLAN  Ginna Pagan 80 y.o. female  is here for follow-up.  She has polyarthralgia in association with osteoarthritis, soft tissue rheumatologic etiologies such as trochanteric bursitis, while gabapentin has provided her significant relief that is residual pain she takes Tylenol already, other choices reviewed,  she is going to try duloxetine at low dose.  We will meet here in 3 months.     .    Diagnoses and all orders for this visit:    Generalized Osteoarthritis Of The Hand  -     DULoxetine (CYMBALTA) 20 MG capsule; Take 1 capsule (20 mg total) by mouth daily.  Dispense: 30 capsule; Refill: 2    Lumbar Spondylosis  -     DULoxetine (CYMBALTA) 20 MG capsule; Take 1 capsule (20 mg total) by mouth daily.  Dispense: 30 capsule; Refill: 2    Polyarthralgia  -     DULoxetine (CYMBALTA) 20 MG capsule; Take 1 capsule (20 mg total) by mouth daily.  Dispense: 30 capsule; Refill: 2          HISTORY OF PRESENTING ILLNESS:  Ginna Pagan, 80 y.o., female is here for follow-up.  She has a polyarthralgias in association with degenerative joint disease both of the appendicular as well as the axial system.  She has noted pain level of 1.5/10 predominantly in her hands bases of the thumbs worse on the left side.  She is finding gabapentin helpful.  She takes it at bedtime.  Pain in the right trochanteric knee area.  Sometimes wakes her up from sleep.  She is able do all her day-to-day activities without difficulty.  She has not had sustained stiffness in the morning.  She takes acetaminophen in addition to gabapentin. She feels that this combination provides her with sufficient relief.  She remains active able to do all her day-to-day activities without difficulty.   She has just recently moved to a senior living complex.   She has had morning stiffness no more than 30 minutes.  She noted to know fever weight loss blurry vision eye redness mouth ulcer nausea cough there is no rash.  Patient denies associated alopecia, fevers, new headache, nodules, rashes/photosensitive and Raynaud's.   Further historical information, including ROS and ADL limitations as noted in the multidimensional health assessment questionnaire scanned in the EMR and in the assessment and plan section.   She is getting her second dose of COVID-19 vaccination today.  ALLERGIES:Clindamycin, Dilaudid [hydromorphone], Morphine, Other allergy (see comments), and Sulfa (sulfonamide antibiotics)    PAST MEDICAL/ACTIVE PROBLEMS/MEDICATION/SOCIAL DATA  Past Medical History:   Diagnosis Date     Arthritis      Hypercholesteremia      Hypertension      Social History     Tobacco Use   Smoking Status Former Smoker     Years: 30.00   Smokeless Tobacco Never Used   Tobacco Comment    1990's     Patient Active Problem List   Diagnosis     Arthralgias In Multiple Sites     Myalgia And Myositis     Generalized Osteoarthritis Of The Hand     Lumbar Spondylosis     Trochanteric Bursitis     Chest pain     HTN (hypertension)     Lipid disorder     SANTOS (dyspnea on exertion)     Diastolic dysfunction     Ventricular hypokinesia     Current Outpatient Medications   Medication Sig Dispense Refill     amLODIPine (NORVASC) 5 MG tablet Take 5 mg by mouth daily.       aspirin 81 MG EC tablet Take 81 mg by mouth daily.       atenolol (TENORMIN) 100 MG tablet Take 100 mg by mouth daily.       atorvastatin (LIPITOR) 20 MG tablet Take 20 mg by mouth bedtime.       calcium, as carbonate, (OS-MYA) 500 mg calcium (1,250 mg) tablet Take 2 tablets by mouth daily.       DENTAGEL 1.1 % Gel dental gel BRUSTH MOUTH WITH PASTE THREE TIMES A DAY  3     fluticasone (FLONASE) 50 mcg/actuation nasal spray 1 spray into each nostril daily.        gabapentin (NEURONTIN) 300 MG capsule Take 1 capsule (300 mg total) by mouth at bedtime. 90 capsule 0     GLUCOSAM HCL/CHONDRO SOLORIO A/C/MN (GLUCOSAMINE-CHONDROITIN COMPLX ORAL) Take 2 capsules by mouth daily.       losartan (COZAAR) 25 MG tablet TAKE ONE OR TWO TABLETS BY MOUTH DAILY       mometasone (NASONEX) 50 mcg/actuation nasal spray use 2 sprays in each nostril once a day       triamterene-hydrochlorothiazide (DYAZIDE) 37.5-25 mg per capsule Take 1 capsule by mouth 3 (three) times a week. Mon, Wed, and Fri       No current facility-administered medications for this visit.      DETAILED EXAMINATION  02/23/21  :  Vitals:    02/23/21 1036   BP: 160/80   Pulse: 78   Weight: 148 lb (67.1 kg)     Alert oriented. Head including the face is examined for malar rash, heliotropes, scarring, lupus pernio. Eyes examined for redness such as in episcleritis/scleritis, periorbital lesions.   Neck/ Face examined for parotid gland swelling, range of motion of neck.  Left upper and lower and right upper and lower extremities examined for tenderness, swelling, warmth of the appendicular joints, range of motion, edema, rash.  Some of the important findings included: There is no synovitis of palpable joints of upper extremities.  Knees without effusion warmth. Left first CMC tender and crepitant with mild grinding.  She has tenderness of the right trochanteric area, and the joint line tenderness of the right knee.        LAB / IMAGING DATA:  ALT   Date Value Ref Range Status   09/04/2020 22 0 - 45 U/L Final   05/28/2019 23 0 - 45 U/L Final   11/26/2018 20 0 - 45 U/L Final     Albumin   Date Value Ref Range Status   09/04/2020 3.5 3.5 - 5.0 g/dL Final   05/28/2019 3.6 3.5 - 5.0 g/dL Final   11/26/2018 3.4 (L) 3.5 - 5.0 g/dL Final     Creatinine   Date Value Ref Range Status   09/04/2020 0.69 0.60 - 1.10 mg/dL Final   05/05/2020 0.69 0.60 - 1.10 mg/dL Final   03/19/2020 0.68 0.60 - 1.10 mg/dL Final       WBC   Date Value Ref Range  Status   09/04/2020 9.2 4.0 - 11.0 thou/uL Final   05/28/2019 7.1 4.0 - 11.0 thou/uL Final   02/27/2015 11.7 (H) 4.0 - 11.0 thou/uL Final       Lab Results   Component Value Date    RF <15.0 07/29/2014    SEDRATE 23 (H) 08/11/2014

## 2021-06-16 NOTE — PROGRESS NOTES
ASSESSMENT AND PLAN  Ginna Pagan 77 y.o. female  is here for follow-up.  She has widespread osteoarthritis, degenerative spinal involvement and lumbar spondylosis.  She is cut back a little on gabapentin down to 300 mg twice daily.  She continues to tolerate that reduction.  She is hurting the most in her left hand base of the thumb.  This seems like worsening of her pain from osteoarthritis of the first CMC's.  Reviewed the options.  She wants to proceed local injection with steroids.  20 mg of methylprednisolone injected into the left first CMC under ultrasound guidance.  She is to return for follow-up.  6 months or sooner.    Diagnoses and all orders for this visit:    Generalized Osteoarthritis Of The Hand  -     methylPREDNISolone acetate injection 20 mg (DEPO-MEDROL); Inject 0.5 mL (20 mg total) into the joint once.    Lumbar Spondylosis  -     gabapentin (NEURONTIN) 300 MG capsule; Take 1 capsule (300 mg total) by mouth 2 (two) times a day.  Dispense: 180 capsule; Refill: 0    Arthralgias In Multiple Sites  -     gabapentin (NEURONTIN) 300 MG capsule; Take 1 capsule (300 mg total) by mouth 2 (two) times a day.  Dispense: 180 capsule; Refill: 0          HISTORY OF PRESENTING ILLNESS:  Ginna Pagan, 77 y.o., female is here for follow-up.  She has a polyarthralgias in association with degenerative joint disease both of the appendical as well as the axial system.  She is reporting worsening pain.  This is in her left hand.  This is epicentered at the base of the left thumb.  She rates it at moderately severe.  Worse with activity.  She has had a good response to corticosteroid injection done in January 2017.  She also noted having cut by gabapentin from 300 mg 3 times daily down to 300 mg 2 times daily and does not notice any significant difference.  She has noted no fever or weight loss blurred vision eye redness mild loss of nausea, but is no rash she has generalized stiffness in the morning for 2  hours.  This is typically across the back .  These are helped by gabapentin. Previous treatments include steroid injection, which helped her for nearly 3 months. Patient denies associated alopecia, fevers, new headache, nodules, rashes/photosensitive and Raynaud's.   Further historical information, including ROS and ADL limitations as noted in the multidimensional health assessment questionnaire scanned in the EMR and in the assessment and plan section.     ALLERGIES:Clindamycin; Dilaudid [hydromorphone]; Morphine; Other allergy (see comments); and Sulfa (sulfonamide antibiotics)    PAST MEDICAL/ACTIVE PROBLEMS/MEDICATION/SOCIAL DATA  Past Medical History:   Diagnosis Date     Arthritis      Hypercholesteremia      Hypertension      History   Smoking Status     Former Smoker     Years: 30.00   Smokeless Tobacco     Never Used     Comment: 1990's     Patient Active Problem List   Diagnosis     Arthralgias In Multiple Sites     Myalgia And Myositis     Generalized Osteoarthritis Of The Hand     Lumbar Spondylosis     Trochanteric Bursitis     Chest pain     HTN (hypertension)     Lipid disorder     SANTOS (dyspnea on exertion)     Diastolic dysfunction     Ventricular hypokinesia     Current Outpatient Prescriptions   Medication Sig Dispense Refill     amLODIPine (NORVASC) 5 MG tablet Take 5 mg by mouth daily.       aspirin 81 MG EC tablet Take 81 mg by mouth daily.       atenolol (TENORMIN) 100 MG tablet Take 100 mg by mouth daily.       atorvastatin (LIPITOR) 20 MG tablet Take 20 mg by mouth bedtime.       calcium, as carbonate, (OS-MYA) 500 mg calcium (1,250 mg) tablet Take 2 tablets by mouth daily.       estradiol (CLIMARA) 0.0375 mg/24 hr   0     fluticasone (FLONASE) 50 mcg/actuation nasal spray 1 spray into each nostril daily.       gabapentin (NEURONTIN) 300 MG capsule take 1 capsule by mouth 3 times per day  270 capsule 0     GLUCOSAM HCL/CHONDRO SOLORIO A/C/MN (GLUCOSAMINE-CHONDROITIN COMPLX ORAL) Take 2 capsules by  "mouth daily.       triamterene-hydrochlorothiazide (DYAZIDE) 37.5-25 mg per capsule Take 1 capsule by mouth 3 (three) times a week. Mon, Wed, and Fri       No current facility-administered medications for this visit.      DETAILED EXAMINATION  03/01/18  :  Vitals:    03/01/18 1035   BP: 120/68   Pulse: 75   Weight: 153 lb (69.4 kg)   Height: 5' 4.75\" (1.645 m)     Alert oriented. Head including the face is examined for malar rash, heliotropes, scarring, lupus pernio. Eyes examined for redness such as in episcleritis/scleritis, periorbital lesions.   Neck/ Face examined for parotid gland swelling, range of motion of neck.  Left upper and lower and right upper and lower extremities examined for tenderness, swelling, warmth of the appendicular joints, range of motion, edema, rash.  Some of the important findings included: Tenderness of the left first CMC with crepitation with minimal grinding.  She has scattered tenderness in the PIPs, right first CMC is minimally tender.  No JLT of the knees..  LAB / IMAGING DATA:  ALT   Date Value Ref Range Status   08/11/2014 25 0 - 45 U/L Final     Albumin   Date Value Ref Range Status   08/11/2014 3.7 3.5 - 5.0 g/dL Final     Creatinine   Date Value Ref Range Status   10/04/2017 0.65 0.60 - 1.10 mg/dL Final   09/13/2016 0.63 0.60 - 1.10 mg/dL Final   11/17/2015 0.60 0.60 - 1.10 mg/dL Final       WBC   Date Value Ref Range Status   02/27/2015 11.7 (H) 4.0 - 11.0 thou/uL Final       Lab Results   Component Value Date    RF <15.0 07/29/2014    SEDRATE 23 (H) 08/11/2014     "

## 2021-06-17 NOTE — PROGRESS NOTES
Ginna Pagan is a 80 y.o. female who is being evaluated via a billable telephone visit.      What phone number would you like to be contacted at? [phone]  How would you like to obtain your AVS? AVS Preference: [place].  Phone call duration: 7 minutes    This document was created using a software with less than 100% fidelity, at times resulting in unintended, even erroneous syntax and grammar.  The reader is advised to keep this under consideration while reviewing, interpreting this note.           ASSESSMENT AND PLAN:    Diagnoses and all orders for this visit:    Generalized Osteoarthritis Of The Hand    Lumbar Spondylosis  -     gabapentin (NEURONTIN) 300 MG capsule; Take 1 capsule (300 mg total) by mouth at bedtime.  Dispense: 90 capsule; Refill: 0    Arthralgias In Multiple Sites  -     gabapentin (NEURONTIN) 300 MG capsule; Take 1 capsule (300 mg total) by mouth at bedtime.  Dispense: 90 capsule; Refill: 0    Enthesopathy of hip region on both sides        Follow up in 6 months      HISTORY OF PRESENTING ILLNESS:  Ginna Pagan 80 y.o. is evaluated here via video/audio link. She has a polyarthralgias in association with degenerative joint disease both of the appendicular as well as the axial system.  Her daughter has psoriasis without anyone else in the family on her side or her father's side.  She has decided not to go for duloxetine.  She is finding gabapentin Tylenol help her significantly except at nighttime she rolls over in side to side because of the trochanteric area pain.  In the past corticosteroid injections have been helpful.  She may want to consider that again she thinks.  We talked about psoriatic arthritis.  The likelihood of that appears to be low.  She is to stay the course.  We will meet here in 6 months.  She remains active able to do all her day-to-day activities without difficulty.  She has just recently moved to a senior living complex.   She has had morning stiffness no more  than 30 minutes.  She noted to know fever weight loss blurry vision eye redness mouth ulcer nausea cough there is no rash.  Patient denies associated alopecia, fevers, new headache, nodules, rashes/photosensitive and Raynaud's.          ROS enquiry held for fever, ocular symptoms, rash, headache,  GI issues.  Today we also discussed the issues related to the current pandemic, the pros and cons of the current treatment plan, the CDC guidelines such as social distancing washing the hands covering the cough.  ALLERGIES:Clindamycin, Dilaudid [hydromorphone], Morphine, Other allergy (see comments), and Sulfa (sulfonamide antibiotics)    PAST MEDICAL/ACTIVE PROBLEMS/MEDICATION/SOCIAL DATA  Past Medical History:   Diagnosis Date     Arthritis      Hypercholesteremia      Hypertension      Social History     Tobacco Use   Smoking Status Former Smoker     Years: 30.00   Smokeless Tobacco Never Used   Tobacco Comment    1990's     Patient Active Problem List   Diagnosis     Arthralgias In Multiple Sites     Myalgia And Myositis     Generalized Osteoarthritis Of The Hand     Lumbar Spondylosis     Trochanteric Bursitis     Chest pain     HTN (hypertension)     Lipid disorder     SANTOS (dyspnea on exertion)     Diastolic dysfunction     Ventricular hypokinesia     Current Outpatient Medications   Medication Sig Dispense Refill     amLODIPine (NORVASC) 5 MG tablet Take 5 mg by mouth daily.       aspirin 81 MG EC tablet Take 81 mg by mouth daily.       atenolol (TENORMIN) 100 MG tablet Take 100 mg by mouth daily.       atorvastatin (LIPITOR) 20 MG tablet Take 20 mg by mouth bedtime.       calcium, as carbonate, (OS-MYA) 500 mg calcium (1,250 mg) tablet Take 2 tablets by mouth daily.       DENTAGEL 1.1 % Gel dental gel BRUSTH MOUTH WITH PASTE THREE TIMES A DAY  3     DULoxetine (CYMBALTA) 20 MG capsule Take 1 capsule (20 mg total) by mouth daily. 30 capsule 2     fluticasone (FLONASE) 50 mcg/actuation nasal spray 1 spray into each  nostril daily.       gabapentin (NEURONTIN) 300 MG capsule Take 1 capsule (300 mg total) by mouth at bedtime. 90 capsule 0     GLUCOSAM HCL/CHONDRO SOLORIO A/C/MN (GLUCOSAMINE-CHONDROITIN COMPLX ORAL) Take 2 capsules by mouth daily.       losartan (COZAAR) 25 MG tablet TAKE ONE OR TWO TABLETS BY MOUTH DAILY       mometasone (NASONEX) 50 mcg/actuation nasal spray use 2 sprays in each nostril once a day       triamterene-hydrochlorothiazide (DYAZIDE) 37.5-25 mg per capsule Take 1 capsule by mouth 3 (three) times a week. Mon, Wed, and Fri       No current facility-administered medications for this visit.          EXAMINATION:     On the phone she sounded comfortable, alert, oriented, her speech was fluent.  Her memory recall appeared normal.  She did not sound like she was in pain or short of breath.    LAB / IMAGING DATA:  ALT   Date Value Ref Range Status   09/04/2020 22 0 - 45 U/L Final   05/28/2019 23 0 - 45 U/L Final   11/26/2018 20 0 - 45 U/L Final     Albumin   Date Value Ref Range Status   09/04/2020 3.5 3.5 - 5.0 g/dL Final   05/28/2019 3.6 3.5 - 5.0 g/dL Final   11/26/2018 3.4 (L) 3.5 - 5.0 g/dL Final     Creatinine   Date Value Ref Range Status   05/19/2021 0.67 0.60 - 1.10 mg/dL Final   09/04/2020 0.69 0.60 - 1.10 mg/dL Final   05/05/2020 0.69 0.60 - 1.10 mg/dL Final       WBC   Date Value Ref Range Status   09/04/2020 9.2 4.0 - 11.0 thou/uL Final   05/28/2019 7.1 4.0 - 11.0 thou/uL Final   02/27/2015 11.7 (H) 4.0 - 11.0 thou/uL Final     Hemoglobin   Date Value Ref Range Status   09/04/2020 15.0 12.0 - 16.0 g/dL Final   05/28/2019 15.0 12.0 - 16.0 g/dL Final   11/26/2018 14.9 12.0 - 16.0 g/dL Final     Platelets   Date Value Ref Range Status   09/04/2020 312 140 - 440 thou/uL Final   05/28/2019 272 140 - 440 thou/uL Final   11/26/2018 260 140 - 440 thou/uL Final       Lab Results   Component Value Date    RF <15.0 07/29/2014    SEDRATE 23 (H) 08/11/2014

## 2021-06-21 NOTE — PROGRESS NOTES
ASSESSMENT AND PLAN  Ginna Pagan 78 y.o. female  is here for follow-up of polyarthralgias in association is affecting the appendicular as well as the back pain in association with lumbar spondylosis responding nicely to gabapentin 300 mg twice daily, she feels at nighttime but gabapentin may suffice.  She is going to drop the morning dose.  And she lost her  of 32 years and may secondary to recurrence of lung cancer.  She is to return for follow-up.  6 months or sooner.    Diagnoses and all orders for this visit:    Arthralgias In Multiple Sites  -     gabapentin (NEURONTIN) 300 MG capsule; Take 1 capsule (300 mg total) by mouth daily.  Dispense: 90 capsule; Refill: 1  -     ALT (SGPT)  -     Albumin  -     Creatinine  -     HM2(CBC w/o Differential)    Lumbar Spondylosis  -     gabapentin (NEURONTIN) 300 MG capsule; Take 1 capsule (300 mg total) by mouth daily.  Dispense: 90 capsule; Refill: 1    Generalized Osteoarthritis Of The Hand          HISTORY OF PRESENTING ILLNESS:  Ginna Pagan, 78 y.o., female is here for follow-up.  She has a polyarthralgias in association with degenerative joint disease both of the appendical as well as the axial system.  Overall she noted some discomfort in her left thumb more than anywhere else where she is on her day-to-day activities without difficulty except having an impaired sleep at night.  She has had morning stiffness no more than 30 minutes.  Is no fever weight loss blurry vision eye redness mouth ulcer nausea cough there is no rash.  She noted her  passed away in May.  They were  for 32 years.  He had developed lung cancer and coronary artery disease and after the surgeries passed away within a month or so.  Her symptoms are helped by gabapentin. Previous treatments include steroid injection, which helped her for nearly 3 months. Patient denies associated alopecia, fevers, new headache, nodules, rashes/photosensitive and Raynaud's.   Further  historical information, including ROS and ADL limitations as noted in the multidimensional health assessment questionnaire scanned in the EMR and in the assessment and plan section.     ALLERGIES:Clindamycin; Dilaudid [hydromorphone]; Morphine; Other allergy (see comments); and Sulfa (sulfonamide antibiotics)    PAST MEDICAL/ACTIVE PROBLEMS/MEDICATION/SOCIAL DATA  Past Medical History:   Diagnosis Date     Arthritis      Hypercholesteremia      Hypertension      Social History     Tobacco Use   Smoking Status Former Smoker     Years: 30.00   Smokeless Tobacco Never Used   Tobacco Comment    1990's     Patient Active Problem List   Diagnosis     Arthralgias In Multiple Sites     Myalgia And Myositis     Generalized Osteoarthritis Of The Hand     Lumbar Spondylosis     Trochanteric Bursitis     Chest pain     HTN (hypertension)     Lipid disorder     SANTOS (dyspnea on exertion)     Diastolic dysfunction     Ventricular hypokinesia     Current Outpatient Medications   Medication Sig Dispense Refill     amLODIPine (NORVASC) 5 MG tablet Take 5 mg by mouth daily.       aspirin 81 MG EC tablet Take 81 mg by mouth daily.       atenolol (TENORMIN) 100 MG tablet Take 100 mg by mouth daily.       atorvastatin (LIPITOR) 20 MG tablet Take 20 mg by mouth bedtime.       calcium, as carbonate, (OS-MYA) 500 mg calcium (1,250 mg) tablet Take 2 tablets by mouth daily.       estradiol (CLIMARA) 0.0375 mg/24 hr   0     fluticasone (FLONASE) 50 mcg/actuation nasal spray 1 spray into each nostril daily.       gabapentin (NEURONTIN) 300 MG capsule Take 1 capsule (300 mg total) by mouth 2 (two) times a day. 180 capsule 0     GLUCOSAM HCL/CHONDRO SOLORIO A/C/MN (GLUCOSAMINE-CHONDROITIN COMPLX ORAL) Take 2 capsules by mouth daily.       triamterene-hydrochlorothiazide (DYAZIDE) 37.5-25 mg per capsule Take 1 capsule by mouth 3 (three) times a week. Mon, Wed, and Fri       No current facility-administered medications for this visit.      DETAILED  EXAMINATION  11/26/18  :  Vitals:    11/26/18 1451   BP: 122/64   Patient Site: Right Arm   Patient Position: Sitting   Cuff Size: Adult Regular   Pulse: 66   Weight: 146 lb (66.2 kg)     Alert oriented. Head including the face is examined for malar rash, heliotropes, scarring, lupus pernio. Eyes examined for redness such as in episcleritis/scleritis, periorbital lesions.   Neck/ Face examined for parotid gland swelling, range of motion of neck.  Left upper and lower and right upper and lower extremities examined for tenderness, swelling, warmth of the appendicular joints, range of motion, edema, rash.  Some of the important findings included: She has tenderness and bony hypertrophy of the first CMC especially the left side.  There is no synovitis of the palpable joints of the upper extremities.  There is no effusion warmth or JLT of the knees on either side.       LAB / IMAGING DATA:  ALT   Date Value Ref Range Status   08/11/2014 25 0 - 45 U/L Final     Albumin   Date Value Ref Range Status   08/11/2014 3.7 3.5 - 5.0 g/dL Final     Creatinine   Date Value Ref Range Status   10/04/2017 0.65 0.60 - 1.10 mg/dL Final   09/13/2016 0.63 0.60 - 1.10 mg/dL Final   11/17/2015 0.60 0.60 - 1.10 mg/dL Final       WBC   Date Value Ref Range Status   02/27/2015 11.7 (H) 4.0 - 11.0 thou/uL Final       Lab Results   Component Value Date    RF <15.0 07/29/2014    SEDRATE 23 (H) 08/11/2014

## 2021-07-04 ENCOUNTER — HEALTH MAINTENANCE LETTER (OUTPATIENT)
Age: 81
End: 2021-07-04

## 2021-08-12 ENCOUNTER — LAB REQUISITION (OUTPATIENT)
Dept: LAB | Facility: CLINIC | Age: 81
End: 2021-08-12

## 2021-08-12 DIAGNOSIS — I10 ESSENTIAL (PRIMARY) HYPERTENSION: ICD-10-CM

## 2021-08-12 LAB
ANION GAP SERPL CALCULATED.3IONS-SCNC: 11 MMOL/L (ref 5–18)
BUN SERPL-MCNC: 12 MG/DL (ref 8–28)
CALCIUM SERPL-MCNC: 9.4 MG/DL (ref 8.5–10.5)
CHLORIDE BLD-SCNC: 97 MMOL/L (ref 98–107)
CO2 SERPL-SCNC: 28 MMOL/L (ref 22–31)
CREAT SERPL-MCNC: 0.69 MG/DL (ref 0.6–1.1)
GFR SERPL CREATININE-BSD FRML MDRD: 81 ML/MIN/1.73M2
GLUCOSE BLD-MCNC: 75 MG/DL (ref 70–125)
POTASSIUM BLD-SCNC: 4.4 MMOL/L (ref 3.5–5)
SODIUM SERPL-SCNC: 136 MMOL/L (ref 136–145)

## 2021-08-12 PROCEDURE — 80048 BASIC METABOLIC PNL TOTAL CA: CPT | Performed by: FAMILY MEDICINE

## 2021-10-24 ENCOUNTER — HEALTH MAINTENANCE LETTER (OUTPATIENT)
Age: 81
End: 2021-10-24

## 2021-11-08 DIAGNOSIS — M25.50 PAIN IN JOINT, MULTIPLE SITES: ICD-10-CM

## 2021-11-08 DIAGNOSIS — M47.817 LUMBOSACRAL SPONDYLOSIS WITHOUT MYELOPATHY: ICD-10-CM

## 2021-11-08 RX ORDER — GABAPENTIN 300 MG/1
CAPSULE ORAL
Qty: 90 CAPSULE | Refills: 0 | Status: SHIPPED | OUTPATIENT
Start: 2021-11-08 | End: 2022-01-06

## 2021-12-01 ENCOUNTER — LAB REQUISITION (OUTPATIENT)
Dept: LAB | Facility: CLINIC | Age: 81
End: 2021-12-01
Payer: COMMERCIAL

## 2021-12-01 DIAGNOSIS — R19.5 OTHER FECAL ABNORMALITIES: ICD-10-CM

## 2021-12-01 PROCEDURE — U0005 INFEC AGEN DETEC AMPLI PROBE: HCPCS | Mod: ORL | Performed by: FAMILY MEDICINE

## 2021-12-02 ENCOUNTER — LAB REQUISITION (OUTPATIENT)
Dept: LAB | Facility: CLINIC | Age: 81
End: 2021-12-02
Payer: COMMERCIAL

## 2021-12-02 DIAGNOSIS — R19.5 OTHER FECAL ABNORMALITIES: ICD-10-CM

## 2021-12-02 LAB — SARS-COV-2 RNA RESP QL NAA+PROBE: NEGATIVE

## 2021-12-02 PROCEDURE — 87506 IADNA-DNA/RNA PROBE TQ 6-11: CPT | Performed by: FAMILY MEDICINE

## 2021-12-02 PROCEDURE — 87329 GIARDIA AG IA: CPT | Performed by: FAMILY MEDICINE

## 2021-12-03 LAB
C COLI+JEJUNI+LARI FUSA STL QL NAA+PROBE: NOT DETECTED
C PARVUM AG STL QL IA: NEGATIVE
EC STX1 GENE STL QL NAA+PROBE: NOT DETECTED
EC STX2 GENE STL QL NAA+PROBE: NOT DETECTED
G LAMBLIA AG STL QL IA: NEGATIVE
NOROV GI+II ORF1-ORF2 JNC STL QL NAA+PR: NOT DETECTED
RVA NSP5 STL QL NAA+PROBE: NOT DETECTED
SALMONELLA SP RPOD STL QL NAA+PROBE: NOT DETECTED
SHIGELLA SP+EIEC IPAH STL QL NAA+PROBE: NOT DETECTED
V CHOL+PARA RFBL+TRKH+TNAA STL QL NAA+PR: NOT DETECTED
Y ENTERO RECN STL QL NAA+PROBE: NOT DETECTED

## 2021-12-10 ENCOUNTER — LAB REQUISITION (OUTPATIENT)
Dept: LAB | Facility: CLINIC | Age: 81
End: 2021-12-10
Payer: COMMERCIAL

## 2021-12-10 DIAGNOSIS — R19.5 OTHER FECAL ABNORMALITIES: ICD-10-CM

## 2021-12-10 PROCEDURE — 87493 C DIFF AMPLIFIED PROBE: CPT | Performed by: FAMILY MEDICINE

## 2021-12-11 LAB — C DIFF TOX B STL QL: NEGATIVE

## 2021-12-27 ENCOUNTER — LAB REQUISITION (OUTPATIENT)
Dept: LAB | Facility: CLINIC | Age: 81
End: 2021-12-27
Payer: COMMERCIAL

## 2021-12-27 DIAGNOSIS — Z03.818 ENCOUNTER FOR OBSERVATION FOR SUSPECTED EXPOSURE TO OTHER BIOLOGICAL AGENTS RULED OUT: ICD-10-CM

## 2021-12-27 PROCEDURE — U0003 INFECTIOUS AGENT DETECTION BY NUCLEIC ACID (DNA OR RNA); SEVERE ACUTE RESPIRATORY SYNDROME CORONAVIRUS 2 (SARS-COV-2) (CORONAVIRUS DISEASE [COVID-19]), AMPLIFIED PROBE TECHNIQUE, MAKING USE OF HIGH THROUGHPUT TECHNOLOGIES AS DESCRIBED BY CMS-2020-01-R: HCPCS | Mod: ORL | Performed by: FAMILY MEDICINE

## 2021-12-28 LAB — SARS-COV-2 RNA RESP QL NAA+PROBE: NEGATIVE

## 2022-01-06 ENCOUNTER — LAB (OUTPATIENT)
Dept: LAB | Facility: CLINIC | Age: 82
End: 2022-01-06

## 2022-01-06 ENCOUNTER — OFFICE VISIT (OUTPATIENT)
Dept: RHEUMATOLOGY | Facility: CLINIC | Age: 82
End: 2022-01-06
Payer: COMMERCIAL

## 2022-01-06 VITALS
HEART RATE: 62 BPM | DIASTOLIC BLOOD PRESSURE: 70 MMHG | SYSTOLIC BLOOD PRESSURE: 134 MMHG | BODY MASS INDEX: 23.76 KG/M2 | WEIGHT: 141.7 LBS

## 2022-01-06 DIAGNOSIS — M15.9 GENERALIZED OSTEOARTHROSIS OF HAND: ICD-10-CM

## 2022-01-06 DIAGNOSIS — M47.817 LUMBOSACRAL SPONDYLOSIS WITHOUT MYELOPATHY: ICD-10-CM

## 2022-01-06 DIAGNOSIS — M25.50 PAIN IN JOINT, MULTIPLE SITES: ICD-10-CM

## 2022-01-06 DIAGNOSIS — M47.817 LUMBOSACRAL SPONDYLOSIS WITHOUT MYELOPATHY: Primary | ICD-10-CM

## 2022-01-06 LAB
ALBUMIN SERPL-MCNC: 3.7 G/DL (ref 3.5–5)
ALT SERPL W P-5'-P-CCNC: 19 U/L (ref 0–45)
CREAT SERPL-MCNC: 0.7 MG/DL (ref 0.6–1.1)
ERYTHROCYTE [DISTWIDTH] IN BLOOD BY AUTOMATED COUNT: 12.3 % (ref 10–15)
GFR SERPL CREATININE-BSD FRML MDRD: 85 ML/MIN/1.73M2
HCT VFR BLD AUTO: 41.7 % (ref 35–47)
HGB BLD-MCNC: 13.8 G/DL (ref 11.7–15.7)
MCH RBC QN AUTO: 31.9 PG (ref 26.5–33)
MCHC RBC AUTO-ENTMCNC: 33.1 G/DL (ref 31.5–36.5)
MCV RBC AUTO: 97 FL (ref 78–100)
PLATELET # BLD AUTO: 301 10E3/UL (ref 150–450)
RBC # BLD AUTO: 4.32 10E6/UL (ref 3.8–5.2)
WBC # BLD AUTO: 7.7 10E3/UL (ref 4–11)

## 2022-01-06 PROCEDURE — 82565 ASSAY OF CREATININE: CPT

## 2022-01-06 PROCEDURE — 85027 COMPLETE CBC AUTOMATED: CPT

## 2022-01-06 PROCEDURE — 36415 COLL VENOUS BLD VENIPUNCTURE: CPT

## 2022-01-06 PROCEDURE — 82040 ASSAY OF SERUM ALBUMIN: CPT

## 2022-01-06 PROCEDURE — 99214 OFFICE O/P EST MOD 30 MIN: CPT | Performed by: INTERNAL MEDICINE

## 2022-01-06 PROCEDURE — 84460 ALANINE AMINO (ALT) (SGPT): CPT

## 2022-01-06 RX ORDER — GABAPENTIN 300 MG/1
CAPSULE ORAL
Qty: 90 CAPSULE | Refills: 0 | Status: SHIPPED | OUTPATIENT
Start: 2022-01-06 | End: 2022-05-02

## 2022-01-06 NOTE — PROGRESS NOTES
Rheumatology follow-up visit note     Ginna is a 81 year old female presents today for follow-up.    Ginna was seen today for recheck.    Diagnoses and all orders for this visit:    Lumbosacral spondylosis without myelopathy  -     gabapentin (NEURONTIN) 300 MG capsule; Take 1 capsule (300 mg total) by mouth at bedtime.  -     ALT; Standing  -     Albumin level; Standing  -     CBC with platelets; Standing  -     Creatinine; Standing    Pain in joint, multiple sites  -     gabapentin (NEURONTIN) 300 MG capsule; Take 1 capsule (300 mg total) by mouth at bedtime.  -     ALT; Standing  -     Albumin level; Standing  -     CBC with platelets; Standing  -     Creatinine; Standing    Generalized osteoarthrosis of hand  -     ALT; Standing  -     Albumin level; Standing  -     CBC with platelets; Standing  -     Creatinine; Standing        This patient with widespread OA affecting appendicular, axial regions has done well with the current regimen of gabapentin, and Tylenol. She is able to do most of her day-to-day activities without difficulty. She will stay the course. Check labs as noted. We will meet here in 6 months or sooner.    Follow up in 6 months.    HPI      She has a polyarthralgias in association with degenerative joint disease both of the appendicular as well as the axial system.  Her daughter has psoriasis without anyone else in the family on her side or her father's side.  She has decided not to go for duloxetine.  She is finding gabapentin Tylenol help her significantly except at nighttime she rolls over in side to side because of the trochanteric area pain.  In the past corticosteroid injections have been helpful.  She may want to consider that again she thinks.  We talked about psoriatic arthritis.  The likelihood of that appears to be low.  She is to stay the course.  We will meet here in 6 months.  She remains active able to do all her day-to-day activities without difficulty.  She has just  recently moved to a senior living complex.   She has had morning stiffness no more than 30 minutes.  She noted to know fever weight loss blurry vision eye redness mouth ulcer nausea cough there is no rash.  Patient denies associated alopecia, fevers, new headache, nodules, rashes/photosensitive and Raynaud's.             DETAILED EXAMINATION  01/06/22  :    Vitals:    01/06/22 0928   BP: 134/70   Pulse: 62   Weight: 64.3 kg (141 lb 11.2 oz)     Alert oriented. Head including the face is examined for malar rash, heliotropes, scarring, lupus pernio. Eyes examined for redness such as in episcleritis/scleritis, periorbital lesions.   Neck/ Face examined for parotid gland swelling, range of motion of neck.  Left upper and lower and right upper and lower extremities examined for tenderness, swelling, warmth of the appendicular joints, range of motion, edema, rash.  Some of the important findings included: she does not have evidence of synovitis in the palpable joints of the upper extremities.  No significant deformities of the digits.  + Heberden nodes.  Range of motion of the shoulders  show full abduction.  No JLT effusion or warmth of the knees.  she does not have dactylitis of the digits. Her gait is normal.     Patient Active Problem List    Diagnosis Date Noted     Generalized Osteoarthritis Of The Hand      Priority: Medium     Created by Conversion  Replacement Utility updated for latest IMO load         Chest pain 02/27/2015     Priority: Medium     HTN (hypertension) 02/27/2015     Priority: Medium     Lipid disorder 02/27/2015     Priority: Medium     SANTOS (dyspnea on exertion) 02/27/2015     Priority: Medium     Diastolic dysfunction 02/27/2015     Priority: Medium     Ventricular hypokinesia 02/27/2015     Priority: Medium     Lumbar Spondylosis      Priority: Medium     Created by Conversion         Arthralgias In Multiple Sites      Priority: Medium     Created by Conversion         Myalgia And Myositis       "Priority: Medium     Created by Conversion         Trochanteric Bursitis      Priority: Medium     Created by Conversion         Past Surgical History:   Procedure Laterality Date     APPENDECTOMY       BACK SURGERY       BLADDER SURGERY      \"Lift\", three times     CATARACT EXTRACTION Bilateral      HYSTERECTOMY       OTHER SURGICAL HISTORY Bilateral     breast implants     TONSILLECTOMY        Past Medical History:   Diagnosis Date     Arthritis      Hypercholesteremia      Hypertension      Allergies   Allergen Reactions     Clindamycin Unknown     Dilaudid [Hydromorphone] Unknown     Morphine Unknown     Other Allergy (See Comments) [External Allergen Needs Reconciliation - See Comment] Unknown     Dilaudid JOANA, 08/11/2014.       Sulfa (Sulfonamide Antibiotics) [Sulfa Drugs] Unknown     Current Outpatient Medications   Medication Sig Dispense Refill     amLODIPine (NORVASC) 5 MG tablet [AMLODIPINE (NORVASC) 5 MG TABLET] Take 5 mg by mouth daily.       aspirin 81 MG EC tablet [ASPIRIN 81 MG EC TABLET] Take 81 mg by mouth daily.       atenolol (TENORMIN) 100 MG tablet [ATENOLOL (TENORMIN) 100 MG TABLET] Take 100 mg by mouth daily.       atorvastatin (LIPITOR) 20 MG tablet [ATORVASTATIN (LIPITOR) 20 MG TABLET] Take 20 mg by mouth bedtime.       calcium, as carbonate, (OS-MYA) 500 mg calcium (1,250 mg) tablet [CALCIUM, AS CARBONATE, (OS-MYA) 500 MG CALCIUM (1,250 MG) TABLET] Take 2 tablets by mouth daily.       DENTAGEL 1.1 % Gel dental gel [DENTAGEL 1.1 % GEL DENTAL GEL] BRUSTH MOUTH WITH PASTE THREE TIMES A DAY  3     fluticasone (FLONASE) 50 mcg/actuation nasal spray [FLUTICASONE (FLONASE) 50 MCG/ACTUATION NASAL SPRAY] 1 spray into each nostril daily.       gabapentin (NEURONTIN) 300 MG capsule Take 1 capsule (300 mg total) by mouth at bedtime. 90 capsule 0     GLUCOSAM HCL/CHONDRO SOLORIO A/C/MN (GLUCOSAMINE-CHONDROITIN COMPLX ORAL) [GLUCOSAM HCL/CHONDRO SOLORIO A/C/MN (GLUCOSAMINE-CHONDROITIN COMPLX ORAL)] Take 2 " capsules by mouth daily.       losartan (COZAAR) 25 MG tablet [LOSARTAN (COZAAR) 25 MG TABLET] TAKE ONE OR TWO TABLETS BY MOUTH DAILY       mometasone (NASONEX) 50 mcg/actuation nasal spray [MOMETASONE (NASONEX) 50 MCG/ACTUATION NASAL SPRAY] use 2 sprays in each nostril once a day       triamterene-hydrochlorothiazide (DYAZIDE) 37.5-25 mg per capsule [TRIAMTERENE-HYDROCHLOROTHIAZIDE (DYAZIDE) 37.5-25 MG PER CAPSULE] Take 1 capsule by mouth 3 (three) times a week. Mon, Wed, and Fri       family history includes Cerebrovascular Disease in her father.  Social Connections: Not on file          WBC   Date Value Ref Range Status   09/04/2020 9.2 4.0 - 11.0 thou/uL Final     RBC Count   Date Value Ref Range Status   09/04/2020 4.56 3.80 - 5.40 mill/uL Final     Hemoglobin   Date Value Ref Range Status   09/04/2020 15.0 12.0 - 16.0 g/dL Final     Hematocrit   Date Value Ref Range Status   09/04/2020 44.1 35.0 - 47.0 % Final     MCV   Date Value Ref Range Status   09/04/2020 97 80 - 100 fL Final     MCH   Date Value Ref Range Status   09/04/2020 32.9 27.0 - 34.0 pg Final     Platelet Count   Date Value Ref Range Status   09/04/2020 312 140 - 440 thou/uL Final     ALT   Date Value Ref Range Status   09/04/2020 22 0 - 45 U/L Final     Albumin   Date Value Ref Range Status   09/04/2020 3.5 3.5 - 5.0 g/dL Final     Creatinine   Date Value Ref Range Status   08/12/2021 0.69 0.60 - 1.10 mg/dL Final     GFR Estimate   Date Value Ref Range Status   08/12/2021 81 >60 mL/min/1.73m2 Final     Comment:     As of July 11, 2021, eGFR is calculated by the CKD-EPI creatinine equation, without race adjustment. eGFR can be influenced by muscle mass, exercise, and diet. The reported eGFR is an estimation only and is only applicable if the renal function is stable.   05/19/2021 >60 >60 mL/min/1.73m2 Final     GFR Estimate If Black   Date Value Ref Range Status   05/19/2021 >60 >60 mL/min/1.73m2 Final

## 2022-05-02 DIAGNOSIS — M25.50 PAIN IN JOINT, MULTIPLE SITES: ICD-10-CM

## 2022-05-02 DIAGNOSIS — M47.817 LUMBOSACRAL SPONDYLOSIS WITHOUT MYELOPATHY: ICD-10-CM

## 2022-05-02 RX ORDER — GABAPENTIN 300 MG/1
CAPSULE ORAL
Qty: 90 CAPSULE | Refills: 0 | Status: SHIPPED | OUTPATIENT
Start: 2022-05-02 | End: 2022-07-11

## 2022-07-11 ENCOUNTER — OFFICE VISIT (OUTPATIENT)
Dept: RHEUMATOLOGY | Facility: CLINIC | Age: 82
End: 2022-07-11
Payer: COMMERCIAL

## 2022-07-11 VITALS
HEART RATE: 60 BPM | WEIGHT: 140.3 LBS | DIASTOLIC BLOOD PRESSURE: 62 MMHG | HEIGHT: 65 IN | BODY MASS INDEX: 23.38 KG/M2 | SYSTOLIC BLOOD PRESSURE: 140 MMHG

## 2022-07-11 DIAGNOSIS — M15.9 GENERALIZED OSTEOARTHROSIS OF HAND: ICD-10-CM

## 2022-07-11 DIAGNOSIS — M47.817 LUMBOSACRAL SPONDYLOSIS WITHOUT MYELOPATHY: ICD-10-CM

## 2022-07-11 DIAGNOSIS — M25.50 PAIN IN JOINT, MULTIPLE SITES: Primary | ICD-10-CM

## 2022-07-11 PROCEDURE — 99214 OFFICE O/P EST MOD 30 MIN: CPT | Performed by: INTERNAL MEDICINE

## 2022-07-11 RX ORDER — GABAPENTIN 300 MG/1
300 CAPSULE ORAL DAILY
Qty: 90 CAPSULE | Refills: 0 | Status: SHIPPED | OUTPATIENT
Start: 2022-07-11 | End: 2022-10-07

## 2022-07-11 NOTE — PROGRESS NOTES
Rheumatology follow-up visit note     Ginna is a 81 year old female presents today for follow-up.    Ginna was seen today for recheck.    Diagnoses and all orders for this visit:    Pain in joint, multiple sites  -     gabapentin (NEURONTIN) 300 MG capsule; Take 1 capsule (300 mg) by mouth daily    Lumbosacral spondylosis without myelopathy  -     gabapentin (NEURONTIN) 300 MG capsule; Take 1 capsule (300 mg) by mouth daily    Generalized osteoarthrosis of hand        Well-controlled polyarthralgias back pain in association with degenerative joint disease with the gabapentin.  Residual pain such as the right trochanteric area that she would like to continue to take acetaminophen for.    Follow up in 6 months.    HPI    Ginna Pagan is a 81 year old female is here for follow-up of polyarthralgias in association with degenerative joint disease both of the appendicular as well as the axial system.  Her daughter has psoriasis without anyone else in the family on her side or her father's side.  She had decided not to go for duloxetine.  She is finding gabapentin Tylenol help her significantly.  Sometimes she is still woken up from sleep because of the right trochanteric area pain.  She is continue to be able to walk most days 2 miles without stopping. We will meet here in 6 months.  She remains active able to do all her day-to-day activities without difficulty.  She has just recently moved to a senior living complex.   She has had morning stiffness no more than 15 minutes.  She noted to know fever weight loss blurry vision eye redness mouth ulcer nausea cough there is no rash.  Patient denies associated alopecia, fevers, new headache, nodules, rashes/photosensitive and Raynaud's.               DETAILED EXAMINATION  07/11/22  :    Vitals:    07/11/22 1105   BP: (!) 160/68   BP Location: Right arm   Patient Position: Sitting   Cuff Size: Adult Regular   Pulse: 60   Weight: 63.6 kg (140 lb 4.8 oz)   Height:  "1.645 m (5' 4.75\")     Alert oriented. Head including the face is examined for malar rash, heliotropes, scarring, lupus pernio. Eyes examined for redness such as in episcleritis/scleritis, periorbital lesions.   Neck/ Face examined for parotid gland swelling, range of motion of neck.  Left upper and lower and right upper and lower extremities examined for tenderness, swelling, warmth of the appendicular joints, range of motion, edema, rash.  Some of the important findings included: she does not have evidence of synovitis in the palpable joints of the upper extremities.  No significant deformities of the digits.  + Heberden nodes.  Range of motion of the shoulders  show full abduction.  No JLT effusion or warmth of the knees.  she does not have dactylitis of the digits.  Mild tenderness in the right trochanteric area.     Patient Active Problem List    Diagnosis Date Noted     Generalized Osteoarthritis Of The Hand      Priority: Medium     Created by Conversion  Replacement Utility updated for latest IMO load         Chest pain 02/27/2015     Priority: Medium     HTN (hypertension) 02/27/2015     Priority: Medium     Lipid disorder 02/27/2015     Priority: Medium     SANTOS (dyspnea on exertion) 02/27/2015     Priority: Medium     Diastolic dysfunction 02/27/2015     Priority: Medium     Ventricular hypokinesia 02/27/2015     Priority: Medium     Lumbar Spondylosis      Priority: Medium     Created by Conversion         Arthralgias In Multiple Sites      Priority: Medium     Created by Conversion         Myalgia And Myositis      Priority: Medium     Created by Conversion         Trochanteric Bursitis      Priority: Medium     Created by Conversion         Past Surgical History:   Procedure Laterality Date     APPENDECTOMY       BACK SURGERY       BLADDER SURGERY      \"Lift\", three times     CATARACT EXTRACTION Bilateral      HYSTERECTOMY       OTHER SURGICAL HISTORY Bilateral     breast implants     TONSILLECTOMY      "   Past Medical History:   Diagnosis Date     Arthritis      Hypercholesteremia      Hypertension      Allergies   Allergen Reactions     Clindamycin Unknown     Dilaudid [Hydromorphone] Unknown     Morphine Unknown     Other Allergy (See Comments) [External Allergen Needs Reconciliation - See Comment] Unknown     Dilaudid JOANA, 08/11/2014.       Sulfa (Sulfonamide Antibiotics) [Sulfa Drugs] Unknown     Current Outpatient Medications   Medication Sig Dispense Refill     amLODIPine (NORVASC) 5 MG tablet [AMLODIPINE (NORVASC) 5 MG TABLET] Take 5 mg by mouth daily.       aspirin 81 MG EC tablet [ASPIRIN 81 MG EC TABLET] Take 81 mg by mouth daily.       atenolol (TENORMIN) 100 MG tablet [ATENOLOL (TENORMIN) 100 MG TABLET] Take 100 mg by mouth daily.       atorvastatin (LIPITOR) 20 MG tablet [ATORVASTATIN (LIPITOR) 20 MG TABLET] Take 20 mg by mouth bedtime.       calcium, as carbonate, (OS-MYA) 500 mg calcium (1,250 mg) tablet [CALCIUM, AS CARBONATE, (OS-MYA) 500 MG CALCIUM (1,250 MG) TABLET] Take 2 tablets by mouth daily.       DENTAGEL 1.1 % Gel dental gel [DENTAGEL 1.1 % GEL DENTAL GEL] BRUSTH MOUTH WITH PASTE THREE TIMES A DAY  3     gabapentin (NEURONTIN) 300 MG capsule Take 1 capsule (300 mg total) by mouth at bedtime. 90 capsule 0     GLUCOSAM HCL/CHONDRO SOLORIO A/C/MN (GLUCOSAMINE-CHONDROITIN COMPLX ORAL) [GLUCOSAM HCL/CHONDRO SOLORIO A/C/MN (GLUCOSAMINE-CHONDROITIN COMPLX ORAL)] Take 2 capsules by mouth daily.       losartan (COZAAR) 25 MG tablet [LOSARTAN (COZAAR) 25 MG TABLET] TAKE ONE OR TWO TABLETS BY MOUTH DAILY       mometasone (NASONEX) 50 mcg/actuation nasal spray [MOMETASONE (NASONEX) 50 MCG/ACTUATION NASAL SPRAY] use 2 sprays in each nostril once a day       triamterene-hydrochlorothiazide (DYAZIDE) 37.5-25 mg per capsule [TRIAMTERENE-HYDROCHLOROTHIAZIDE (DYAZIDE) 37.5-25 MG PER CAPSULE] Take 1 capsule by mouth 3 (three) times a week. Mon, Wed, and Fri       fluticasone (FLONASE) 50 mcg/actuation nasal spray  [FLUTICASONE (FLONASE) 50 MCG/ACTUATION NASAL SPRAY] 1 spray into each nostril daily. (Patient not taking: Reported on 7/11/2022)       family history includes Cerebrovascular Disease in her father.  Social Connections: Not on file          WBC Count   Date Value Ref Range Status   01/06/2022 7.7 4.0 - 11.0 10e3/uL Final     RBC Count   Date Value Ref Range Status   01/06/2022 4.32 3.80 - 5.20 10e6/uL Final     Hemoglobin   Date Value Ref Range Status   01/06/2022 13.8 11.7 - 15.7 g/dL Final     Hematocrit   Date Value Ref Range Status   01/06/2022 41.7 35.0 - 47.0 % Final     MCV   Date Value Ref Range Status   01/06/2022 97 78 - 100 fL Final     MCH   Date Value Ref Range Status   01/06/2022 31.9 26.5 - 33.0 pg Final     Platelet Count   Date Value Ref Range Status   01/06/2022 301 150 - 450 10e3/uL Final     ALT   Date Value Ref Range Status   01/06/2022 19 0 - 45 U/L Final     Albumin   Date Value Ref Range Status   01/06/2022 3.7 3.5 - 5.0 g/dL Final     Creatinine   Date Value Ref Range Status   01/06/2022 0.70 0.60 - 1.10 mg/dL Final     GFR Estimate   Date Value Ref Range Status   01/06/2022 85 >60 mL/min/1.73m2 Final     Comment:     Effective December 21, 2021 eGFRcr in adults is calculated using the 2021 CKD-EPI creatinine equation which includes age and gender (Roselyn ricardo al., NEJM, DOI: 10.1056/EPQYxp4141793)   05/19/2021 >60 >60 mL/min/1.73m2 Final     GFR Estimate If Black   Date Value Ref Range Status   05/19/2021 >60 >60 mL/min/1.73m2 Final

## 2022-07-27 DIAGNOSIS — M47.817 LUMBOSACRAL SPONDYLOSIS WITHOUT MYELOPATHY: ICD-10-CM

## 2022-07-27 DIAGNOSIS — M25.50 PAIN IN JOINT, MULTIPLE SITES: ICD-10-CM

## 2022-07-27 RX ORDER — GABAPENTIN 300 MG/1
CAPSULE ORAL
Qty: 90 CAPSULE | Refills: 0 | OUTPATIENT
Start: 2022-07-27

## 2022-07-31 ENCOUNTER — HEALTH MAINTENANCE LETTER (OUTPATIENT)
Age: 82
End: 2022-07-31

## 2022-08-15 ENCOUNTER — LAB REQUISITION (OUTPATIENT)
Dept: LAB | Facility: CLINIC | Age: 82
End: 2022-08-15

## 2022-08-15 DIAGNOSIS — E78.2 MIXED HYPERLIPIDEMIA: ICD-10-CM

## 2022-08-15 DIAGNOSIS — I10 ESSENTIAL (PRIMARY) HYPERTENSION: ICD-10-CM

## 2022-08-15 LAB
ANION GAP SERPL CALCULATED.3IONS-SCNC: 10 MMOL/L (ref 7–15)
BUN SERPL-MCNC: 14.7 MG/DL (ref 8–23)
CALCIUM SERPL-MCNC: 10.3 MG/DL (ref 8.8–10.2)
CHLORIDE SERPL-SCNC: 100 MMOL/L (ref 98–107)
CHOLEST SERPL-MCNC: 131 MG/DL
CREAT SERPL-MCNC: 0.61 MG/DL (ref 0.51–0.95)
DEPRECATED HCO3 PLAS-SCNC: 30 MMOL/L (ref 22–29)
GFR SERPL CREATININE-BSD FRML MDRD: 88 ML/MIN/1.73M2
GLUCOSE SERPL-MCNC: 82 MG/DL (ref 70–99)
HDLC SERPL-MCNC: 54 MG/DL
LDLC SERPL CALC-MCNC: 54 MG/DL
NONHDLC SERPL-MCNC: 77 MG/DL
POTASSIUM SERPL-SCNC: 4.4 MMOL/L (ref 3.4–5.3)
SODIUM SERPL-SCNC: 140 MMOL/L (ref 136–145)
TRIGL SERPL-MCNC: 117 MG/DL

## 2022-08-15 PROCEDURE — 80048 BASIC METABOLIC PNL TOTAL CA: CPT | Performed by: FAMILY MEDICINE

## 2022-08-15 PROCEDURE — 80061 LIPID PANEL: CPT | Performed by: FAMILY MEDICINE

## 2022-09-14 ENCOUNTER — LAB REQUISITION (OUTPATIENT)
Dept: LAB | Facility: CLINIC | Age: 82
End: 2022-09-14

## 2022-09-14 DIAGNOSIS — M85.80 OTHER SPECIFIED DISORDERS OF BONE DENSITY AND STRUCTURE, UNSPECIFIED SITE: ICD-10-CM

## 2022-09-14 DIAGNOSIS — E83.52 HYPERCALCEMIA: ICD-10-CM

## 2022-09-14 LAB — CALCIUM SERPL-MCNC: 9.1 MG/DL (ref 8.8–10.2)

## 2022-09-14 PROCEDURE — 82306 VITAMIN D 25 HYDROXY: CPT | Performed by: FAMILY MEDICINE

## 2022-09-14 PROCEDURE — 82310 ASSAY OF CALCIUM: CPT | Performed by: FAMILY MEDICINE

## 2022-09-15 LAB — DEPRECATED CALCIDIOL+CALCIFEROL SERPL-MC: 46 UG/L (ref 20–75)

## 2022-10-07 DIAGNOSIS — M47.817 LUMBOSACRAL SPONDYLOSIS WITHOUT MYELOPATHY: ICD-10-CM

## 2022-10-07 DIAGNOSIS — M25.50 PAIN IN JOINT, MULTIPLE SITES: ICD-10-CM

## 2022-10-07 RX ORDER — GABAPENTIN 300 MG/1
300 CAPSULE ORAL DAILY
Qty: 90 CAPSULE | Refills: 0 | Status: SHIPPED | OUTPATIENT
Start: 2022-10-07 | End: 2023-01-11

## 2022-10-16 ENCOUNTER — HEALTH MAINTENANCE LETTER (OUTPATIENT)
Age: 82
End: 2022-10-16

## 2023-01-09 ENCOUNTER — LAB (OUTPATIENT)
Dept: LAB | Facility: CLINIC | Age: 83
End: 2023-01-09
Payer: COMMERCIAL

## 2023-01-09 DIAGNOSIS — M25.50 PAIN IN JOINT, MULTIPLE SITES: ICD-10-CM

## 2023-01-09 DIAGNOSIS — M47.817 LUMBOSACRAL SPONDYLOSIS WITHOUT MYELOPATHY: ICD-10-CM

## 2023-01-09 DIAGNOSIS — M15.9 GENERALIZED OSTEOARTHROSIS OF HAND: ICD-10-CM

## 2023-01-09 LAB
ALBUMIN SERPL BCG-MCNC: 3.9 G/DL (ref 3.5–5.2)
ALT SERPL W P-5'-P-CCNC: 17 U/L (ref 10–35)
CREAT SERPL-MCNC: 0.61 MG/DL (ref 0.51–0.95)
ERYTHROCYTE [DISTWIDTH] IN BLOOD BY AUTOMATED COUNT: 12.3 % (ref 10–15)
GFR SERPL CREATININE-BSD FRML MDRD: 88 ML/MIN/1.73M2
HCT VFR BLD AUTO: 42.1 % (ref 35–47)
HGB BLD-MCNC: 14.3 G/DL (ref 11.7–15.7)
MCH RBC QN AUTO: 32.9 PG (ref 26.5–33)
MCHC RBC AUTO-ENTMCNC: 34 G/DL (ref 31.5–36.5)
MCV RBC AUTO: 97 FL (ref 78–100)
PLATELET # BLD AUTO: 286 10E3/UL (ref 150–450)
RBC # BLD AUTO: 4.35 10E6/UL (ref 3.8–5.2)
WBC # BLD AUTO: 7.8 10E3/UL (ref 4–11)

## 2023-01-09 PROCEDURE — 36415 COLL VENOUS BLD VENIPUNCTURE: CPT

## 2023-01-09 PROCEDURE — 84460 ALANINE AMINO (ALT) (SGPT): CPT

## 2023-01-09 PROCEDURE — 82040 ASSAY OF SERUM ALBUMIN: CPT

## 2023-01-09 PROCEDURE — 82565 ASSAY OF CREATININE: CPT

## 2023-01-09 PROCEDURE — 85027 COMPLETE CBC AUTOMATED: CPT

## 2023-01-11 ENCOUNTER — VIRTUAL VISIT (OUTPATIENT)
Dept: RHEUMATOLOGY | Facility: CLINIC | Age: 83
End: 2023-01-11
Payer: COMMERCIAL

## 2023-01-11 DIAGNOSIS — M47.817 LUMBOSACRAL SPONDYLOSIS WITHOUT MYELOPATHY: ICD-10-CM

## 2023-01-11 DIAGNOSIS — M25.50 PAIN IN JOINT, MULTIPLE SITES: ICD-10-CM

## 2023-01-11 PROCEDURE — 99214 OFFICE O/P EST MOD 30 MIN: CPT | Mod: 95 | Performed by: INTERNAL MEDICINE

## 2023-01-11 RX ORDER — GABAPENTIN 300 MG/1
300 CAPSULE ORAL DAILY
Qty: 90 CAPSULE | Refills: 1 | Status: SHIPPED | OUTPATIENT
Start: 2023-01-11 | End: 2023-07-10

## 2023-01-11 RX ORDER — ALENDRONATE SODIUM 70 MG/1
TABLET ORAL
COMMUNITY
Start: 2022-09-14

## 2023-01-11 NOTE — PROGRESS NOTES
Ginna is a 82 year old who is being evaluated via a billable video visit.      How would you like to obtain your AVS? MyChart  If the video visit is dropped, the invitation should be resent by: [email or web address]  Will anyone else be joining your video visit? No        Video-Visit Details    Type of service:  Video Visit     oined the call at 1/11/2023, 10:58:49 am.  Left the call at 1/11/2023, 11:05:53 am.      Originating Location (pt. Location): Home    Distant Location (provider location):  On-site  Platform used for Video Visit: [employer]     This document was created using a software with less than 100% fidelity, at times resulting in unintended, even erroneous syntax and grammar.  The reader is advised to keep this under consideration while reviewing, interpreting this note.           ASSESSMENT AND PLAN:    Diagnoses and all orders for this visit:  Lumbosacral spondylosis without myelopathy  -     gabapentin (NEURONTIN) 300 MG capsule; Take 1 capsule (300 mg) by mouth daily  Pain in joint, multiple sites  -     gabapentin (NEURONTIN) 300 MG capsule; Take 1 capsule (300 mg) by mouth daily      Follow up in 6 months      HISTORY OF PRESENTING ILLNESS:  Ginna Pagan 82 year old is evaluated here via video/audio link. polyarthralgias in association with degenerative joint disease both of the appendicular as well as the axial system.  Her daughter has psoriasis without anyone else in the family on her side or her father's side.  She had decided not to go for duloxetine.  She is finding gabapentin continues to provide her with good pain relief.  She only takes it at bedtime.  She takes 2000 in the morning and 2 in nighttime.  She is able to do all her day-to-day activities, grocery shopping cooking 2 miles of walking a day driving.  She lives by herself in a senior living apartment complex.  She is not woken up from sleep because of the pain in any of the joints or back.    Recent labs are  normal.  ALLERGIES:Clindamycin, Dilaudid [hydromorphone], Morphine, Other allergy (see comments) [external allergen needs reconciliation - see comment], and Sulfa (sulfonamide antibiotics) [sulfa drugs]    PAST MEDICAL/ACTIVE PROBLEMS/MEDICATION/SOCIAL DATA  Past Medical History:   Diagnosis Date     Arthritis      Hypercholesteremia      Hypertension      History   Smoking Status     Former     Years: 30.00   Smokeless Tobacco     Never     Comment: 1990's     Patient Active Problem List   Diagnosis     Arthralgias In Multiple Sites     Myalgia And Myositis     Generalized Osteoarthritis Of The Hand     Lumbar Spondylosis     Trochanteric Bursitis     Chest pain     HTN (hypertension)     Lipid disorder     SANTOS (dyspnea on exertion)     Diastolic dysfunction     Ventricular hypokinesia     Current Outpatient Medications   Medication Sig Dispense Refill     alendronate (FOSAMAX) 70 MG tablet Once weekly       amLODIPine (NORVASC) 5 MG tablet [AMLODIPINE (NORVASC) 5 MG TABLET] Take 5 mg by mouth daily.       aspirin 81 MG EC tablet [ASPIRIN 81 MG EC TABLET] Take 81 mg by mouth daily.       atenolol (TENORMIN) 100 MG tablet [ATENOLOL (TENORMIN) 100 MG TABLET] Take 100 mg by mouth daily.       atorvastatin (LIPITOR) 20 MG tablet [ATORVASTATIN (LIPITOR) 20 MG TABLET] Take 20 mg by mouth bedtime.       calcium, as carbonate, (OS-MYA) 500 mg calcium (1,250 mg) tablet [CALCIUM, AS CARBONATE, (OS-MYA) 500 MG CALCIUM (1,250 MG) TABLET] Take 2 tablets by mouth daily.       DENTAGEL 1.1 % Gel dental gel [DENTAGEL 1.1 % GEL DENTAL GEL] BRUSTH MOUTH WITH PASTE THREE TIMES A DAY  3     gabapentin (NEURONTIN) 300 MG capsule Take 1 capsule (300 mg) by mouth daily. 90 capsule 0     GLUCOSAM HCL/CHONDRO SOLORIO A/C/MN (GLUCOSAMINE-CHONDROITIN COMPLX ORAL) [GLUCOSAM HCL/CHONDRO SOLORIO A/C/MN (GLUCOSAMINE-CHONDROITIN COMPLX ORAL)] Take 2 capsules by mouth daily.       losartan (COZAAR) 25 MG tablet [LOSARTAN (COZAAR) 25 MG TABLET] TAKE ONE OR  TWO TABLETS BY MOUTH DAILY       mometasone (NASONEX) 50 mcg/actuation nasal spray [MOMETASONE (NASONEX) 50 MCG/ACTUATION NASAL SPRAY] use 2 sprays in each nostril once a day       triamterene-hydrochlorothiazide (DYAZIDE) 37.5-25 mg per capsule [TRIAMTERENE-HYDROCHLOROTHIAZIDE (DYAZIDE) 37.5-25 MG PER CAPSULE] Take 1 capsule by mouth 3 (three) times a week. Mon, Wed, and Fri       fluticasone (FLONASE) 50 mcg/actuation nasal spray [FLUTICASONE (FLONASE) 50 MCG/ACTUATION NASAL SPRAY] 1 spray into each nostril daily. (Patient not taking: Reported on 7/11/2022)           EXAMINATION:    Using the audio and video link as best as possible the constitutional, neck, neurologic, psych, skin, both upper extremities areas/organ system were evaluated during this assessment.  Some of the important findings: Alert, oriented, speech fluent.   Able to fully flex the digits, into fists bilaterally, wrist and elbow range of motion appear normal, abduction of the shoulder is normal.      LAB / IMAGING DATA:  ALT   Date Value Ref Range Status   01/09/2023 17 10 - 35 U/L Final   01/06/2022 19 0 - 45 U/L Final   09/04/2020 22 0 - 45 U/L Final     Albumin   Date Value Ref Range Status   01/09/2023 3.9 3.5 - 5.2 g/dL Final   01/06/2022 3.7 3.5 - 5.0 g/dL Final   09/04/2020 3.5 3.5 - 5.0 g/dL Final   05/28/2019 3.6 3.5 - 5.0 g/dL Final       WBC Count   Date Value Ref Range Status   01/09/2023 7.8 4.0 - 11.0 10e3/uL Final   01/06/2022 7.7 4.0 - 11.0 10e3/uL Final     Hemoglobin   Date Value Ref Range Status   01/09/2023 14.3 11.7 - 15.7 g/dL Final   01/06/2022 13.8 11.7 - 15.7 g/dL Final   09/04/2020 15.0 12.0 - 16.0 g/dL Final     Platelet Count   Date Value Ref Range Status   01/09/2023 286 150 - 450 10e3/uL Final   01/06/2022 301 150 - 450 10e3/uL Final   09/04/2020 312 140 - 440 thou/uL Final       No results found for: CHRISSY

## 2023-02-15 ENCOUNTER — LAB REQUISITION (OUTPATIENT)
Dept: LAB | Facility: CLINIC | Age: 83
End: 2023-02-15

## 2023-02-15 DIAGNOSIS — R68.89 OTHER GENERAL SYMPTOMS AND SIGNS: ICD-10-CM

## 2023-02-15 LAB — TSH SERPL DL<=0.005 MIU/L-ACNC: 1.83 UIU/ML (ref 0.3–4.2)

## 2023-02-15 PROCEDURE — 84443 ASSAY THYROID STIM HORMONE: CPT | Performed by: FAMILY MEDICINE

## 2023-07-07 DIAGNOSIS — M47.817 LUMBOSACRAL SPONDYLOSIS WITHOUT MYELOPATHY: ICD-10-CM

## 2023-07-07 DIAGNOSIS — M25.50 PAIN IN JOINT, MULTIPLE SITES: ICD-10-CM

## 2023-07-10 RX ORDER — GABAPENTIN 300 MG/1
300 CAPSULE ORAL DAILY
Qty: 90 CAPSULE | Refills: 0 | Status: SHIPPED | OUTPATIENT
Start: 2023-07-10 | End: 2023-08-17

## 2023-08-11 ENCOUNTER — LAB REQUISITION (OUTPATIENT)
Dept: LAB | Facility: CLINIC | Age: 83
End: 2023-08-11

## 2023-08-11 DIAGNOSIS — R10.9 UNSPECIFIED ABDOMINAL PAIN: ICD-10-CM

## 2023-08-11 LAB
ALBUMIN UR-MCNC: NEGATIVE MG/DL
APPEARANCE UR: CLEAR
BACTERIA #/AREA URNS HPF: ABNORMAL /HPF
BILIRUB UR QL STRIP: NEGATIVE
COLOR UR AUTO: ABNORMAL
GLUCOSE UR STRIP-MCNC: NEGATIVE MG/DL
HGB UR QL STRIP: NEGATIVE
KETONES UR STRIP-MCNC: NEGATIVE MG/DL
LEUKOCYTE ESTERASE UR QL STRIP: NEGATIVE
NITRATE UR QL: NEGATIVE
PH UR STRIP: 7.5 [PH] (ref 5–7)
RBC URINE: 1 /HPF
SP GR UR STRIP: 1 (ref 1–1.03)
SQUAMOUS EPITHELIAL: <1 /HPF
UROBILINOGEN UR STRIP-MCNC: NORMAL MG/DL
WBC CLUMPS #/AREA URNS HPF: PRESENT /HPF
WBC URINE: 3 /HPF

## 2023-08-11 PROCEDURE — 81001 URINALYSIS AUTO W/SCOPE: CPT | Performed by: PHYSICIAN ASSISTANT

## 2023-08-15 ENCOUNTER — LAB REQUISITION (OUTPATIENT)
Dept: LAB | Facility: CLINIC | Age: 83
End: 2023-08-15

## 2023-08-15 DIAGNOSIS — I10 ESSENTIAL (PRIMARY) HYPERTENSION: ICD-10-CM

## 2023-08-15 DIAGNOSIS — E78.2 MIXED HYPERLIPIDEMIA: ICD-10-CM

## 2023-08-15 LAB
ANION GAP SERPL CALCULATED.3IONS-SCNC: 11 MMOL/L (ref 7–15)
BUN SERPL-MCNC: 14.9 MG/DL (ref 8–23)
CALCIUM SERPL-MCNC: 9.8 MG/DL (ref 8.8–10.2)
CHLORIDE SERPL-SCNC: 98 MMOL/L (ref 98–107)
CHOLEST SERPL-MCNC: 112 MG/DL
CREAT SERPL-MCNC: 0.62 MG/DL (ref 0.51–0.95)
DEPRECATED HCO3 PLAS-SCNC: 27 MMOL/L (ref 22–29)
GFR SERPL CREATININE-BSD FRML MDRD: 87 ML/MIN/1.73M2
GLUCOSE SERPL-MCNC: 72 MG/DL (ref 70–99)
HDLC SERPL-MCNC: 52 MG/DL
LDLC SERPL CALC-MCNC: 43 MG/DL
NONHDLC SERPL-MCNC: 60 MG/DL
POTASSIUM SERPL-SCNC: 4.4 MMOL/L (ref 3.4–5.3)
SODIUM SERPL-SCNC: 136 MMOL/L (ref 136–145)
TRIGL SERPL-MCNC: 85 MG/DL

## 2023-08-15 PROCEDURE — 80048 BASIC METABOLIC PNL TOTAL CA: CPT | Performed by: FAMILY MEDICINE

## 2023-08-15 PROCEDURE — 80061 LIPID PANEL: CPT | Performed by: FAMILY MEDICINE

## 2023-08-17 ENCOUNTER — OFFICE VISIT (OUTPATIENT)
Dept: RHEUMATOLOGY | Facility: CLINIC | Age: 83
End: 2023-08-17
Payer: COMMERCIAL

## 2023-08-17 VITALS
DIASTOLIC BLOOD PRESSURE: 70 MMHG | BODY MASS INDEX: 22.92 KG/M2 | SYSTOLIC BLOOD PRESSURE: 122 MMHG | HEART RATE: 76 BPM | WEIGHT: 136.7 LBS

## 2023-08-17 DIAGNOSIS — M47.817 LUMBOSACRAL SPONDYLOSIS WITHOUT MYELOPATHY: ICD-10-CM

## 2023-08-17 DIAGNOSIS — M25.50 PAIN IN JOINT, MULTIPLE SITES: ICD-10-CM

## 2023-08-17 DIAGNOSIS — M15.0 PRIMARY OSTEOARTHRITIS INVOLVING MULTIPLE JOINTS: Primary | ICD-10-CM

## 2023-08-17 PROCEDURE — 99214 OFFICE O/P EST MOD 30 MIN: CPT | Performed by: INTERNAL MEDICINE

## 2023-08-17 RX ORDER — GABAPENTIN 300 MG/1
300 CAPSULE ORAL DAILY
Qty: 90 CAPSULE | Refills: 1 | Status: SHIPPED | OUTPATIENT
Start: 2023-08-17 | End: 2024-02-19

## 2023-08-17 NOTE — PROGRESS NOTES
Rheumatology follow-up visit note     Ginna is a 82 year old female presents today for follow-up.    Ginna was seen today for recheck.    Diagnoses and all orders for this visit:    Primary osteoarthritis involving multiple joints    Lumbosacral spondylosis without myelopathy  -     gabapentin (NEURONTIN) 300 MG capsule; Take 1 capsule (300 mg) by mouth daily    Pain in joint, multiple sites  -     gabapentin (NEURONTIN) 300 MG capsule; Take 1 capsule (300 mg) by mouth daily        Gabapentin is controlling her joint symptoms and back pain.  Continue as now.  We will meet in 6 months.  Recent labs show normal liver and kidney function.    Follow up in 6 months.    HPI    Ginna Pagan is a 82 year old female is here for follow-up of polyarthralgias in association with degenerative joint disease both of the appendicular as well as the axial system.  Her daughter has psoriasis without anyone else in the family on her side or her father's side.  She had decided not to go for duloxetine.  She is finding gabapentin continues to provide her with good pain relief.  She only takes it at bedtime.  She also takes acetaminophen sustained-release typically first thing in the morning..  She is able to do all her day-to-day activities, grocery shopping cooking 2 miles of walking a day she is continue to able to drive.  She lives by herself in a senior living apartment complex.  She is not woken up from sleep because of the pain in any of the joints or back.         DETAILED EXAMINATION  08/17/23  :    Vitals:    08/17/23 0934   BP: 122/70   Pulse: 76   Weight: 62 kg (136 lb 11.2 oz)     Alert oriented. Head including the face is examined for malar rash, heliotropes, scarring, lupus pernio. Eyes examined for redness such as in episcleritis/scleritis, periorbital lesions.   Neck/ Face examined for parotid gland swelling, range of motion of neck.  Left upper and lower and right upper and lower extremities examined for  "tenderness, swelling, warmth of the appendicular joints, range of motion, edema, rash.  Some of the important findings included: she does not have evidence of synovitis in the palpable joints of the upper extremities.  Marked squaring of the first CMC's with significant deformities of the digits.  Minimal Heberden nodes.  Range of motion of the shoulders  show full abduction.  No JLT effusion or warmth of the knees.  she does not have dactylitis of the digits.     Patient Active Problem List    Diagnosis Date Noted    Generalized Osteoarthritis Of The Hand      Priority: Medium     Created by Conversion  Replacement Utility updated for latest IMO load        Chest pain 02/27/2015     Priority: Medium    HTN (hypertension) 02/27/2015     Priority: Medium    Lipid disorder 02/27/2015     Priority: Medium    SANTOS (dyspnea on exertion) 02/27/2015     Priority: Medium    Diastolic dysfunction 02/27/2015     Priority: Medium    Ventricular hypokinesia 02/27/2015     Priority: Medium    Lumbar Spondylosis      Priority: Medium     Created by Conversion        Arthralgias In Multiple Sites      Priority: Medium     Created by Conversion        Myalgia And Myositis      Priority: Medium     Created by Conversion        Trochanteric Bursitis      Priority: Medium     Created by Conversion         Past Surgical History:   Procedure Laterality Date    APPENDECTOMY      BACK SURGERY      BLADDER SURGERY      \"Lift\", three times    CATARACT EXTRACTION Bilateral     HYSTERECTOMY      OTHER SURGICAL HISTORY Bilateral     breast implants    TONSILLECTOMY        Past Medical History:   Diagnosis Date    Arthritis     Hypercholesteremia     Hypertension      Allergies   Allergen Reactions    Clindamycin Unknown    Dilaudid [Hydromorphone] Unknown    Morphine Unknown    Other Allergy (See Comments) [External Allergen Needs Reconciliation - See Comment] Unknown     Dilaudid JOANA, 08/11/2014.      Sulfa (Sulfonamide Antibiotics) [Sulfa " Antibiotics] Unknown     Current Outpatient Medications   Medication Sig Dispense Refill    alendronate (FOSAMAX) 70 MG tablet Once weekly      amLODIPine (NORVASC) 5 MG tablet [AMLODIPINE (NORVASC) 5 MG TABLET] Take 5 mg by mouth daily.      aspirin 81 MG EC tablet [ASPIRIN 81 MG EC TABLET] Take 81 mg by mouth daily.      atenolol (TENORMIN) 100 MG tablet [ATENOLOL (TENORMIN) 100 MG TABLET] Take 100 mg by mouth daily.      atorvastatin (LIPITOR) 20 MG tablet [ATORVASTATIN (LIPITOR) 20 MG TABLET] Take 20 mg by mouth bedtime.      calcium, as carbonate, (OS-MYA) 500 mg calcium (1,250 mg) tablet [CALCIUM, AS CARBONATE, (OS-MYA) 500 MG CALCIUM (1,250 MG) TABLET] Take 2 tablets by mouth daily.      DENTAGEL 1.1 % Gel dental gel [DENTAGEL 1.1 % GEL DENTAL GEL] BRUSTH MOUTH WITH PASTE THREE TIMES A DAY  3    gabapentin (NEURONTIN) 300 MG capsule Take 1 capsule (300 mg) by mouth daily. 90 capsule 0    losartan (COZAAR) 25 MG tablet [LOSARTAN (COZAAR) 25 MG TABLET] TAKE ONE OR TWO TABLETS BY MOUTH DAILY      mometasone (NASONEX) 50 mcg/actuation nasal spray [MOMETASONE (NASONEX) 50 MCG/ACTUATION NASAL SPRAY] use 2 sprays in each nostril once a day      fluticasone (FLONASE) 50 mcg/actuation nasal spray [FLUTICASONE (FLONASE) 50 MCG/ACTUATION NASAL SPRAY] 1 spray into each nostril daily. (Patient not taking: Reported on 7/11/2022)      GLUCOSAM HCL/CHONDRO SOLORIO A/C/MN (GLUCOSAMINE-CHONDROITIN COMPLX ORAL) [GLUCOSAM HCL/CHONDRO SOLORIO A/C/MN (GLUCOSAMINE-CHONDROITIN COMPLX ORAL)] Take 2 capsules by mouth daily. (Patient not taking: Reported on 8/17/2023)      triamterene-hydrochlorothiazide (DYAZIDE) 37.5-25 mg per capsule [TRIAMTERENE-HYDROCHLOROTHIAZIDE (DYAZIDE) 37.5-25 MG PER CAPSULE] Take 1 capsule by mouth 3 (three) times a week. Mon, Wed, and Fri       family history includes Cerebrovascular Disease in her father.  Social Connections: Not on file          WBC Count   Date Value Ref Range Status   01/09/2023 7.8 4.0 - 11.0  10e3/uL Final     RBC Count   Date Value Ref Range Status   01/09/2023 4.35 3.80 - 5.20 10e6/uL Final     Hemoglobin   Date Value Ref Range Status   01/09/2023 14.3 11.7 - 15.7 g/dL Final     Hematocrit   Date Value Ref Range Status   01/09/2023 42.1 35.0 - 47.0 % Final     MCV   Date Value Ref Range Status   01/09/2023 97 78 - 100 fL Final     MCH   Date Value Ref Range Status   01/09/2023 32.9 26.5 - 33.0 pg Final     Platelet Count   Date Value Ref Range Status   01/09/2023 286 150 - 450 10e3/uL Final     ALT   Date Value Ref Range Status   01/09/2023 17 10 - 35 U/L Final     Albumin   Date Value Ref Range Status   01/09/2023 3.9 3.5 - 5.2 g/dL Final   01/06/2022 3.7 3.5 - 5.0 g/dL Final     Creatinine   Date Value Ref Range Status   08/15/2023 0.62 0.51 - 0.95 mg/dL Final     GFR Estimate   Date Value Ref Range Status   08/15/2023 87 >60 mL/min/1.73m2 Final   05/19/2021 >60 >60 mL/min/1.73m2 Final     GFR Estimate If Black   Date Value Ref Range Status   05/19/2021 >60 >60 mL/min/1.73m2 Final

## 2023-08-26 ENCOUNTER — HEALTH MAINTENANCE LETTER (OUTPATIENT)
Age: 83
End: 2023-08-26

## 2024-02-19 ENCOUNTER — OFFICE VISIT (OUTPATIENT)
Dept: RHEUMATOLOGY | Facility: CLINIC | Age: 84
End: 2024-02-19
Payer: COMMERCIAL

## 2024-02-19 VITALS
BODY MASS INDEX: 23.16 KG/M2 | HEART RATE: 65 BPM | DIASTOLIC BLOOD PRESSURE: 60 MMHG | SYSTOLIC BLOOD PRESSURE: 128 MMHG | WEIGHT: 138.1 LBS

## 2024-02-19 DIAGNOSIS — M25.50 PAIN IN JOINT, MULTIPLE SITES: ICD-10-CM

## 2024-02-19 DIAGNOSIS — M15.0 PRIMARY OSTEOARTHRITIS INVOLVING MULTIPLE JOINTS: Primary | ICD-10-CM

## 2024-02-19 DIAGNOSIS — M47.817 LUMBOSACRAL SPONDYLOSIS WITHOUT MYELOPATHY: ICD-10-CM

## 2024-02-19 PROCEDURE — 99214 OFFICE O/P EST MOD 30 MIN: CPT | Performed by: INTERNAL MEDICINE

## 2024-02-19 PROCEDURE — G2211 COMPLEX E/M VISIT ADD ON: HCPCS | Performed by: INTERNAL MEDICINE

## 2024-02-19 RX ORDER — GABAPENTIN 100 MG/1
200 CAPSULE ORAL AT BEDTIME
Qty: 180 CAPSULE | Refills: 0 | Status: SHIPPED | OUTPATIENT
Start: 2024-02-19 | End: 2024-05-20

## 2024-02-19 RX ORDER — AMLODIPINE BESYLATE 2.5 MG/1
2.5 TABLET ORAL DAILY
COMMUNITY
Start: 2024-01-26

## 2024-02-19 RX ORDER — GABAPENTIN 300 MG/1
300 CAPSULE ORAL DAILY
Qty: 90 CAPSULE | Refills: 1 | Status: SHIPPED | OUTPATIENT
Start: 2024-02-19 | End: 2024-02-19

## 2024-02-19 ASSESSMENT — PAIN SCALES - GENERAL: PAINLEVEL: NO PAIN (0)

## 2024-02-19 NOTE — PROGRESS NOTES
Rheumatology follow-up visit note     Ginna is a 83 year old female presents today for follow-up.    Ginna was seen today for recheck.    Diagnoses and all orders for this visit:    Primary osteoarthritis involving multiple joints  -     gabapentin (NEURONTIN) 100 MG capsule; Take 2 capsules (200 mg) by mouth at bedtime for 90 days    Pain in joint, multiple sites  -     ALT; Future  -     Albumin level; Future  -     CBC with platelets; Future  -     Creatinine; Future  -     Discontinue: gabapentin (NEURONTIN) 300 MG capsule; Take 1 capsule (300 mg) by mouth daily  -     gabapentin (NEURONTIN) 100 MG capsule; Take 2 capsules (200 mg) by mouth at bedtime for 90 days    Lumbosacral spondylosis without myelopathy  -     Discontinue: gabapentin (NEURONTIN) 300 MG capsule; Take 1 capsule (300 mg) by mouth daily    This patient with osteoarthritis both axial and appendicular involvement Appears to be doing rather well, we talked about once again duloxetine versus gabapentin, she would like to see how she might do with lesser dose of gabapentin will go to 200 mg nightly.  Will meet here in 3 months.  She is due for labs.  She is going to have physical tomorrow I have given her a printout to take to her primary physician to those labs.    Follow up in 3 months.    HPI    Ginna Pagan is a 83 year old female is here for follow-up of  polyarthralgias in association with degenerative joint disease both of the appendicular as well as the axial system.  Her daughter has psoriasis without anyone else in the family on her side or her father's side.  She had decided not to go for duloxetine.  She is finding gabapentin continues to provide her with good pain relief.  She only takes it at bedtime.  She also takes acetaminophen sustained-release typically first thing in the morning..  She is able to do all her day-to-day activities, grocery shopping cooking 2 miles of walking a day she is continue to able to drive.   She lives by herself in a senior living apartment complex.  She is not woken up from sleep because of the pain in any of the joints or back.     DETAILED EXAMINATION  02/19/24  :    Vitals:    02/19/24 1035   BP: 128/60   BP Location: Right arm   Patient Position: Sitting   Cuff Size: Adult Regular   Pulse: 65   Weight: 62.6 kg (138 lb 1.6 oz)     Alert oriented. Head including the face is examined for malar rash, heliotropes, scarring, lupus pernio. Eyes examined for redness such as in episcleritis/scleritis, periorbital lesions.   Neck/ Face examined for parotid gland swelling, range of motion of neck.  Left upper and lower and right upper and lower extremities examined for tenderness, swelling, warmth of the appendicular joints, range of motion, edema, rash.  Some of the important findings included: she does not have evidence of synovitis in the palpable joints of the upper extremities.  No significant deformities of the digits.  no Heberden nodes.  Range of motion of the shoulders  show full abduction.  No JLT effusion or warmth of the knees.  she does not have dactylitis of the digits.     Patient Active Problem List    Diagnosis Date Noted    Generalized Osteoarthritis Of The Hand      Priority: Medium     Created by Conversion  Replacement Utility updated for latest IMO load        Chest pain 02/27/2015     Priority: Medium    HTN (hypertension) 02/27/2015     Priority: Medium    Lipid disorder 02/27/2015     Priority: Medium    SANTOS (dyspnea on exertion) 02/27/2015     Priority: Medium    Diastolic dysfunction 02/27/2015     Priority: Medium    Ventricular hypokinesia 02/27/2015     Priority: Medium    Lumbar Spondylosis      Priority: Medium     Created by Conversion        Arthralgias In Multiple Sites      Priority: Medium     Created by Conversion        Myalgia And Myositis      Priority: Medium     Created by Conversion        Trochanteric Bursitis      Priority: Medium     Created by Conversion      "    Past Surgical History:   Procedure Laterality Date    APPENDECTOMY      BACK SURGERY      BLADDER SURGERY      \"Lift\", three times    CATARACT EXTRACTION Bilateral     HYSTERECTOMY      OTHER SURGICAL HISTORY Bilateral     breast implants    TONSILLECTOMY        Past Medical History:   Diagnosis Date    Arthritis     Hypercholesteremia     Hypertension      Allergies   Allergen Reactions    Clindamycin Unknown    Dilaudid [Hydromorphone] Unknown    Morphine Unknown    Other Allergy (See Comments) [External Allergen Needs Reconciliation - See Comment] Unknown     Dilaudid JOANA, 08/11/2014.      Sulfa (Sulfonamide Antibiotics) [Sulfa Antibiotics] Unknown     Current Outpatient Medications   Medication Sig Dispense Refill    alendronate (FOSAMAX) 70 MG tablet Once weekly      amLODIPine (NORVASC) 5 MG tablet [AMLODIPINE (NORVASC) 5 MG TABLET] Take 5 mg by mouth daily.      aspirin 81 MG EC tablet [ASPIRIN 81 MG EC TABLET] Take 81 mg by mouth daily.      atenolol (TENORMIN) 100 MG tablet [ATENOLOL (TENORMIN) 100 MG TABLET] Take 100 mg by mouth daily.      atorvastatin (LIPITOR) 20 MG tablet [ATORVASTATIN (LIPITOR) 20 MG TABLET] Take 20 mg by mouth bedtime.      calcium, as carbonate, (OS-MYA) 500 mg calcium (1,250 mg) tablet [CALCIUM, AS CARBONATE, (OS-MYA) 500 MG CALCIUM (1,250 MG) TABLET] Take 2 tablets by mouth daily.      DENTAGEL 1.1 % Gel dental gel [DENTAGEL 1.1 % GEL DENTAL GEL] BRUSTH MOUTH WITH PASTE THREE TIMES A DAY  3    fluticasone (FLONASE) 50 mcg/actuation nasal spray [FLUTICASONE (FLONASE) 50 MCG/ACTUATION NASAL SPRAY] 1 spray into each nostril daily. (Patient not taking: Reported on 7/11/2022)      gabapentin (NEURONTIN) 300 MG capsule Take 1 capsule (300 mg) by mouth daily 90 capsule 1    GLUCOSAM HCL/CHONDRO SOLORIO A/C/MN (GLUCOSAMINE-CHONDROITIN COMPLX ORAL) [GLUCOSAM HCL/CHONDRO SOLORIO A/C/MN (GLUCOSAMINE-CHONDROITIN COMPLX ORAL)] Take 2 capsules by mouth daily. (Patient not taking: Reported on " 8/17/2023)      losartan (COZAAR) 25 MG tablet [LOSARTAN (COZAAR) 25 MG TABLET] TAKE ONE OR TWO TABLETS BY MOUTH DAILY      mometasone (NASONEX) 50 mcg/actuation nasal spray [MOMETASONE (NASONEX) 50 MCG/ACTUATION NASAL SPRAY] use 2 sprays in each nostril once a day      triamterene-hydrochlorothiazide (DYAZIDE) 37.5-25 mg per capsule [TRIAMTERENE-HYDROCHLOROTHIAZIDE (DYAZIDE) 37.5-25 MG PER CAPSULE] Take 1 capsule by mouth 3 (three) times a week. Mon, Wed, and Fri       family history includes Cerebrovascular Disease in her father.  Social Connections: Not on file          WBC Count   Date Value Ref Range Status   01/09/2023 7.8 4.0 - 11.0 10e3/uL Final     RBC Count   Date Value Ref Range Status   01/09/2023 4.35 3.80 - 5.20 10e6/uL Final     Hemoglobin   Date Value Ref Range Status   01/09/2023 14.3 11.7 - 15.7 g/dL Final     Hematocrit   Date Value Ref Range Status   01/09/2023 42.1 35.0 - 47.0 % Final     MCV   Date Value Ref Range Status   01/09/2023 97 78 - 100 fL Final     MCH   Date Value Ref Range Status   01/09/2023 32.9 26.5 - 33.0 pg Final     Platelet Count   Date Value Ref Range Status   01/09/2023 286 150 - 450 10e3/uL Final     ALT   Date Value Ref Range Status   01/09/2023 17 10 - 35 U/L Final     Albumin   Date Value Ref Range Status   01/09/2023 3.9 3.5 - 5.2 g/dL Final   01/06/2022 3.7 3.5 - 5.0 g/dL Final     Creatinine   Date Value Ref Range Status   08/15/2023 0.62 0.51 - 0.95 mg/dL Final     GFR Estimate   Date Value Ref Range Status   08/15/2023 87 >60 mL/min/1.73m2 Final   05/19/2021 >60 >60 mL/min/1.73m2 Final     GFR Estimate If Black   Date Value Ref Range Status   05/19/2021 >60 >60 mL/min/1.73m2 Final

## 2024-02-23 ENCOUNTER — LAB (OUTPATIENT)
Dept: LAB | Facility: CLINIC | Age: 84
End: 2024-02-23
Payer: COMMERCIAL

## 2024-02-23 DIAGNOSIS — M25.50 PAIN IN JOINT, MULTIPLE SITES: ICD-10-CM

## 2024-02-23 LAB
ALBUMIN SERPL BCG-MCNC: 4 G/DL (ref 3.5–5.2)
ALT SERPL W P-5'-P-CCNC: 19 U/L (ref 0–50)
CREAT SERPL-MCNC: 0.7 MG/DL (ref 0.51–0.95)
EGFRCR SERPLBLD CKD-EPI 2021: 84 ML/MIN/1.73M2
ERYTHROCYTE [DISTWIDTH] IN BLOOD BY AUTOMATED COUNT: 12.4 % (ref 10–15)
HCT VFR BLD AUTO: 41.8 % (ref 35–47)
HGB BLD-MCNC: 14 G/DL (ref 11.7–15.7)
MCH RBC QN AUTO: 32.5 PG (ref 26.5–33)
MCHC RBC AUTO-ENTMCNC: 33.5 G/DL (ref 31.5–36.5)
MCV RBC AUTO: 97 FL (ref 78–100)
PLATELET # BLD AUTO: 256 10E3/UL (ref 150–450)
RBC # BLD AUTO: 4.31 10E6/UL (ref 3.8–5.2)
WBC # BLD AUTO: 8 10E3/UL (ref 4–11)

## 2024-02-23 PROCEDURE — 82565 ASSAY OF CREATININE: CPT

## 2024-02-23 PROCEDURE — 82040 ASSAY OF SERUM ALBUMIN: CPT

## 2024-02-23 PROCEDURE — 84460 ALANINE AMINO (ALT) (SGPT): CPT

## 2024-02-23 PROCEDURE — 85027 COMPLETE CBC AUTOMATED: CPT

## 2024-02-23 PROCEDURE — 36415 COLL VENOUS BLD VENIPUNCTURE: CPT

## 2024-03-19 ENCOUNTER — OFFICE VISIT (OUTPATIENT)
Dept: PHARMACY | Facility: PHYSICIAN GROUP | Age: 84
End: 2024-03-19
Payer: COMMERCIAL

## 2024-03-19 VITALS
BODY MASS INDEX: 22.82 KG/M2 | DIASTOLIC BLOOD PRESSURE: 66 MMHG | HEIGHT: 65 IN | HEART RATE: 65 BPM | WEIGHT: 137 LBS | SYSTOLIC BLOOD PRESSURE: 150 MMHG

## 2024-03-19 DIAGNOSIS — R52 PAIN: ICD-10-CM

## 2024-03-19 DIAGNOSIS — J30.2 SEASONAL ALLERGIC RHINITIS, UNSPECIFIED TRIGGER: ICD-10-CM

## 2024-03-19 DIAGNOSIS — I10 HYPERTENSION, ESSENTIAL: Primary | ICD-10-CM

## 2024-03-19 DIAGNOSIS — M81.0 OSTEOPOROSIS WITHOUT CURRENT PATHOLOGICAL FRACTURE, UNSPECIFIED OSTEOPOROSIS TYPE: ICD-10-CM

## 2024-03-19 DIAGNOSIS — E78.5 HYPERLIPIDEMIA, UNSPECIFIED HYPERLIPIDEMIA TYPE: ICD-10-CM

## 2024-03-19 PROCEDURE — 99607 MTMS BY PHARM ADDL 15 MIN: CPT | Performed by: PHARMACIST

## 2024-03-19 PROCEDURE — 99605 MTMS BY PHARM NP 15 MIN: CPT | Performed by: PHARMACIST

## 2024-03-19 RX ORDER — FUROSEMIDE 40 MG
40 TABLET ORAL DAILY
COMMUNITY

## 2024-03-19 RX ORDER — SPIRONOLACTONE 25 MG/1
25 TABLET ORAL DAILY
COMMUNITY

## 2024-03-19 RX ORDER — LOSARTAN POTASSIUM 100 MG/1
100 TABLET ORAL DAILY
COMMUNITY

## 2024-03-19 NOTE — LETTER
March 19, 2024  Ginna Pagan  [address]  [facility] [address]    Dear Ms. Pagan, [facility]     Thank you for talking with me on Mar 19, 2024 about your health and medications. As a follow-up to our conversation, I have included two documents:      Your Recommended To-Do List has steps you should take to get the best results from your medications.  Your Medication List will help you keep track of your medications and how to take them.    If you want to talk about these documents, please call Karime Mckeon RPH, PharmD, BCACP  at phone: [phone], Monday-Friday 8-4:30pm.    I look forward to working with you and your doctors to make sure your medications work well for you.    Sincerely,  Karime Mckeon RPH, PharmD, BCACP   [facility] Pharmacist, [facility]

## 2024-03-19 NOTE — PROGRESS NOTES
Medication Therapy Management (MTM) Encounter    ASSESSMENT:                            Medication Adherence/Access: No issues identified    Hypertension:   Patient is not meeting blood pressure goal of < 140/90mmHg. Patient would benefit from stopping triamterene/hydrochlorothiazide and trial of spironolactone with close monitoring of K+ and kidney function.    Hyperlipidemia   Stable.    Allergic Rhinitis:   Stable.    Osteoporosis:   Stable.    Osteoarthritis:    Stable.      PLAN:                            1. Stop triamterene/hydrochlorothiazide. This is a two-in-one water pill, so getting off of it will hopefully help reduce how often you have to go to the bathroom.    2. Start spironolactone 25mg one tablet daily. This medication may be more effective at lowering blood pressure. We need to keep a close eye on your kidney function and potassium levels, so we will check your kidney function at our next visit.    Follow-up:   Apr 04, 2024  1:30 PM  Pharmacist Visit with Karime Mckeon, [facility] ([facility] [place] ) [phone]     Karime Mckeon, Pharm.D., [facility]  Medication Therapy Management Pharmacist  [phone]     SUBJECTIVE/OBJECTIVE:                          Ginna Pagan is a 83 year old female coming in for an initial visit. She was referred to me from Savannah Wen . Patient saw provider prior to our visit today.     Reason for visit: Med Review, Blood pressure.    Allergies/ADRs: Reviewed in chart  Past Medical History: Reviewed in chart  Tobacco: She reports that she has quit smoking. She has never used smokeless tobacco.  Alcohol: Less than 1 beverage / month  Caffeine: 1/2 caf 4 cups - sometimes drinks the whole amount  Medication Adherence/Access: no issues reported    Hypertension   Amlodipine 2.5mg QAM - higher doses caused swelling  Atenolol 50mg twice daily  Losartan 100mg QAM  Furosemide 40mg  "QAM  Triamterene/hydrochlorothiazide 37.5/25mg 3x/week - \"I hate that one\", urinating constantly especially pronounced the days she takes it  Patient reports the following medication side effects: urination as above, otherwise none  Patient self monitors blood pressure.  Home BP monitoring 161/78, P65; 149/76, P61, 135/66, P68; 136/63, P61; 152/71, P64 .    Adds very little salt to cooking and does watch sodium in diet, buys low sodium options     Potassium   Date Value Ref Range Status   08/15/2023 4.4 3.4 - 5.3 mmol/L Final   08/12/2021 4.4 3.5 - 5.0 mmol/L Final       Hyperlipidemia   Atorvastatin 20mg at bedtime   Patient reports no significant myalgias or other side effects.       Allergic Rhinitis:   loratadine 10 mg once daily  Mometasone nasal spray 50mcg 2 sprays in each nostril daily  Patient reports no current medication side effects.       Osteoporosis:   alendronate (Fosamax) 70mg weekly (has been on current therapy 2 years)  Patient is not experiencing side effects.  Risk factors: post-menopausal  Last vitamin D level:  Lab Results   Component Value Date    VITDT 46 09/14/2022     Osteoarthritis:    Gabapentin 200mg at bedtime - dose reduced recently by rheum and she believes the plan is to taper off completely  Acetaminophen 500mg 2-3 tablets PRN  Acetaminophen + diphenhydramine 500/25mg at bedtime   Patient reports the following side effects:  Denies Side Effects.  Follows with rheumatology      Today's Vitals: BP (!) 150/66   Pulse 65   Ht 5' 5\" (1.651 m)   Wt 137 lb (62.1 kg)   BMI 22.80 kg/m    ----------------      I spent 30 minutes with this patient today. All changes were made via collaborative practice agreement with Savannah Wen MD. A copy of the visit note was provided to the patient's provider(s).    A summary of these recommendations was given to the patient.    Karime Mckeon, Pharm.D., [facility]  Medication Therapy Management Pharmacist  [phone]          Medication Therapy " Recommendations  Hypertension, essential    Current Medication: triamterene-hydrochlorothiazide (DYAZIDE) 37.5-25 mg per capsule (Discontinued)   Rationale: More effective medication available - Ineffective medication - Effectiveness   Recommendation: Change Medication - SPIRONOLACTONE   Status: Accepted per CPA

## 2024-03-19 NOTE — LETTER
_  Medication List        Prepared on: 03/19/2024     Bring your Medication List when you go to the doctor, hospital, or   emergency room. And, share it with your family or caregivers.     Note any changes to how you take your medications.  Cross out medications when you no longer use them.    Medication How I take it Why I use it Prescriber   alendronate (FOSAMAX) 70 MG tablet Once weekly  Osteoporosis Savannah Wen    amLODIPine (NORVASC) 2.5 MG tablet Take 2.5 mg by mouth daily High Blood Pressure Disorder Savannah Wen      aspirin 81 MG EC tablet [ASPIRIN 81 MG EC TABLET] Take 81 mg by mouth daily. High Blood Pressure Disorder Savannah Wen      atenolol (TENORMIN) 100 MG tablet Take 50 mg by mouth 2 times daily High Blood Pressure Disorder Savannah Wen MD   atorvastatin (LIPITOR) 20 MG tablet [ATORVASTATIN (LIPITOR) 20 MG TABLET] Take 20 mg by mouth bedtime. High Cholesterol High Blood Pressure Disorder   calcium, as carbonate, (OS-MYA) 500 mg calcium (1,250 mg) tablet [CALCIUM, AS CARBONATE, (OS-MYA) 500 MG CALCIUM (1,250 MG) TABLET] Take 2 tablets by mouth daily. Osteoporosis Savannah Wen    furosemide (LASIX) 40 MG tablet Take 40 mg by mouth daily High Blood Pressure Disorder Savannah Wen MD   gabapentin (NEURONTIN) 100 MG capsule Take 2 capsules (200 mg) by mouth at bedtime for 90 days Primary osteoarthritis involving multiple joints; Pain in Joint, Multiple Sites MARY Curry   losartan (COZAAR) 100 MG tablet Take 100 mg by mouth daily High Blood Pressure Disorder Savannah Wen MD   mometasone (NASONEX) 50 mcg/actuation nasal spray [MOMETASONE (NASONEX) 50 MCG/ACTUATION NASAL SPRAY] use 2 sprays in each nostril once a day  Allergies Patient reported   spironolactone (ALDACTONE) 25 MG tablet Take 25 mg by mouth daily High Blood Pressure Disorder Savannah Wen MD         Add new medications, over-the-counter drugs, herbals, vitamins, or  minerals in the blank rows below.    Medication How I  take it Why I use it Prescriber                                      Allergies:      clindamycin; dilaudid [hydromorphone]; morphine; other allergy (see comments) [external allergen needs reconciliation - see comment]; sulfa (sulfonamide antibiotics) [sulfa antibiotics]        Side effects I have had:               Other Information:              My notes and questions:

## 2024-03-19 NOTE — PATIENT INSTRUCTIONS
Recommendations from today's MT visit:                                                      1. Stop triamterene/hydrochlorothiazide. This is a two-in-one water pill, so getting off of it will hopefully help reduce how often you have to go to the bathroom.    2. Start spironolactone 25mg one tablet daily. This medication may be more effective at lowering blood pressure. We need to keep a close eye on your kidney function and potassium levels, so we will check your kidney function at our next visit.    Follow-up:   Apr 04, 2024  1:30 PM  Pharmacist Visit with Karime Mckeon, [facility] ([facility] ) [phone]     Karime Mckeon Pharm.D., [facility]  Medication Therapy Management Pharmacist  [phone]     It was great to speak with you today.  I value your experience and would be very thankful for your time with providing feedback on our clinic survey. You may receive a survey via email or text message in the next few days.     To schedule another [facility] appointment, please call the clinic directly or you may call the scheduling line at [phone].    My Clinical Pharmacist's contact information:                                                      Please feel free to contact me with any questions or concerns you have.      Karime Mckeon Pharm.D., [facility]  Medication Therapy Management Pharmacist  [phone]

## 2024-03-19 NOTE — LETTER
"Recommended To-Do List      Prepared on: 03/19/2024       You can get the best results from your medications by completing the items on this \"To-Do List.\"      Bring your To-Do List when you go to your doctor. And, share it with your family or caregivers.    My To-Do List:  What we talked about: What I should do:   A medication that is not working    Change the medication you are taking from triamterene-HCTZ (DYAZIDE) to SPIRONOLACTONE          What we talked about: What I should do:                     "

## 2024-04-03 NOTE — PROGRESS NOTES
Medication Therapy Management (MTM) Encounter    ASSESSMENT:                            Medication Adherence/Access: No issues identified    Hypertension:   Patient is meeting blood pressure goal of < 140/90mmHg with recent switch to spironolactone. Labs pending time of visit.      PLAN:                            Continue present medications. Will let you know what the lab results are.    Follow-up: as needed for medication questions or concerns.       SUBJECTIVE/OBJECTIVE:                          Ginna Pagan is a 83 year old female coming in for a follow-up visit.      Reason for visit: Medication Review.    Allergies/ADRs: Reviewed in chart  Past Medical History: Reviewed in chart  Tobacco: She reports that she has quit smoking. She has never used smokeless tobacco.  Alcohol: Social History    Substance and Sexual Activity      Alcohol use: Yes        Comment: Alcoholic Drinks/day: occasional wine       Medication Adherence/Access: no issues reported    Hypertension   Amlodipine 2.5mg QAM - higher doses caused swelling  Atenolol 50mg twice daily  Spironolactone 25mg daily - started last visit, no side effects noted; urination frequency is better now off of triamterene/hydrochlorothiazide. Will have BMP today pending time of visit  Losartan 100mg QAM  Furosemide 40mg QAM  Patient reports the following medication side effects: urination as above, otherwise none  Medication History:  Triamterene/hydrochlorothiazide 37.5/25mg 3x/week - stopped in lieu of starting spironolactone, urinating constantly especially pronounced the days she takes it    Patient self monitors blood pressure.  Home BP monitoring 154/83p65, 130/67p61, 161/78p56, 149/76p61, 135/66p68, 136/63p61 - home monitor in clinic today reads 137/62, p 67  Adds very little salt to cooking and does watch sodium in diet, buys low sodium options  3c/day of coffee 1/2 caf         Today's Vitals: /60   Wt 137 lb 9.6 oz (62.4 kg)   BMI 22.90 kg/m     ----------------      I spent 30 minutes with this patient today. All changes were made via collaborative practice agreement with Savannah Wen MD. A copy of the visit note was provided to the patient's provider(s).    A summary of these recommendations was sent via [employer].    Karime Mckeon, Pharm.D., [facility]  Medication Therapy Management Pharmacist  [phone]          Medication Therapy Recommendations  No medication therapy recommendations to display

## 2024-04-04 ENCOUNTER — LAB REQUISITION (OUTPATIENT)
Dept: LAB | Facility: CLINIC | Age: 84
End: 2024-04-04

## 2024-04-04 ENCOUNTER — OFFICE VISIT (OUTPATIENT)
Dept: PHARMACY | Facility: PHYSICIAN GROUP | Age: 84
End: 2024-04-04
Payer: COMMERCIAL

## 2024-04-04 VITALS — WEIGHT: 137.6 LBS | DIASTOLIC BLOOD PRESSURE: 60 MMHG | BODY MASS INDEX: 22.9 KG/M2 | SYSTOLIC BLOOD PRESSURE: 136 MMHG

## 2024-04-04 DIAGNOSIS — I10 HYPERTENSION, ESSENTIAL: Primary | ICD-10-CM

## 2024-04-04 DIAGNOSIS — I10 ESSENTIAL (PRIMARY) HYPERTENSION: ICD-10-CM

## 2024-04-04 PROCEDURE — 80048 BASIC METABOLIC PNL TOTAL CA: CPT | Performed by: FAMILY MEDICINE

## 2024-04-04 PROCEDURE — 99606 MTMS BY PHARM EST 15 MIN: CPT | Performed by: PHARMACIST

## 2024-04-04 NOTE — PATIENT INSTRUCTIONS
Recommendations from today's MTM visit:                                                      Continue present medications. Will let you know what the lab results are.    Follow-up: as needed for medication questions or concerns.     It was great to speak with you today.  I value your experience and would be very thankful for your time with providing feedback on our clinic survey. You may receive a survey via email or text message in the next few days.     To schedule another MTM appointment, please call the clinic directly or you may call the scheduling line at [phone].    My Clinical Pharmacist's contact information:                                                      Please feel free to contact me with any questions or concerns you have.      Karime Mckeon, Pharm.D., [facility]  Medication Therapy Management Pharmacist  [phone]    
day(s)

## 2024-04-05 LAB
ANION GAP SERPL CALCULATED.3IONS-SCNC: 12 MMOL/L (ref 7–15)
BUN SERPL-MCNC: 20.6 MG/DL (ref 8–23)
CALCIUM SERPL-MCNC: 9.5 MG/DL (ref 8.8–10.2)
CHLORIDE SERPL-SCNC: 101 MMOL/L (ref 98–107)
CREAT SERPL-MCNC: 0.82 MG/DL (ref 0.51–0.95)
DEPRECATED HCO3 PLAS-SCNC: 28 MMOL/L (ref 22–29)
EGFRCR SERPLBLD CKD-EPI 2021: 70 ML/MIN/1.73M2
GLUCOSE SERPL-MCNC: 162 MG/DL (ref 70–99)
POTASSIUM SERPL-SCNC: 4.1 MMOL/L (ref 3.4–5.3)
SODIUM SERPL-SCNC: 141 MMOL/L (ref 135–145)

## 2024-05-20 ENCOUNTER — OFFICE VISIT (OUTPATIENT)
Dept: RHEUMATOLOGY | Facility: CLINIC | Age: 84
End: 2024-05-20
Payer: COMMERCIAL

## 2024-05-20 VITALS
DIASTOLIC BLOOD PRESSURE: 66 MMHG | OXYGEN SATURATION: 96 % | BODY MASS INDEX: 22.15 KG/M2 | WEIGHT: 133.1 LBS | HEART RATE: 57 BPM | SYSTOLIC BLOOD PRESSURE: 142 MMHG

## 2024-05-20 DIAGNOSIS — M15.0 PRIMARY OSTEOARTHRITIS INVOLVING MULTIPLE JOINTS: Primary | ICD-10-CM

## 2024-05-20 DIAGNOSIS — M25.50 PAIN IN JOINT, MULTIPLE SITES: ICD-10-CM

## 2024-05-20 DIAGNOSIS — M47.817 LUMBOSACRAL SPONDYLOSIS WITHOUT MYELOPATHY: ICD-10-CM

## 2024-05-20 DIAGNOSIS — M15.9 GENERALIZED OSTEOARTHROSIS OF HAND: ICD-10-CM

## 2024-05-20 PROCEDURE — 99214 OFFICE O/P EST MOD 30 MIN: CPT | Performed by: INTERNAL MEDICINE

## 2024-05-20 PROCEDURE — G2211 COMPLEX E/M VISIT ADD ON: HCPCS | Performed by: INTERNAL MEDICINE

## 2024-05-20 RX ORDER — GABAPENTIN 100 MG/1
100 CAPSULE ORAL AT BEDTIME
Qty: 90 CAPSULE | Refills: 0 | Status: SHIPPED | OUTPATIENT
Start: 2024-05-20 | End: 2024-08-01

## 2024-05-20 NOTE — PROGRESS NOTES
Rheumatology follow-up visit note     Ginna is a 83 year old female presents today for follow-up.    Ginna was seen today for recheck.    Diagnoses and all orders for this visit:    Primary osteoarthritis involving multiple joints  -     gabapentin (NEURONTIN) 100 MG capsule; Take 1 capsule (100 mg) by mouth at bedtime for 90 days    Pain in joint, multiple sites  -     gabapentin (NEURONTIN) 100 MG capsule; Take 1 capsule (100 mg) by mouth at bedtime for 90 days    Lumbosacral spondylosis without myelopathy    Generalized osteoarthrosis of hand        Will try lower on her previous visit the dose was dropped to 200 mg daily she cannot identify any worsening of her symptoms.  Will go down to 100 mg at nighttime.  Should this turn out to be correct choice for her she will go back to 200 mg at nighttime, I will give us a call at that point.  The longitudinal plan of care for the diagnosis(es)/condition(s) as documented were addressed during this visit. Due to the added complexity in care, I will continue to support Ginna in the subsequent management and with ongoing continuity of care.     Follow up in 3 months.    HPI    Ginna Pagan is a 83 year old female is here for follow-up of  polyarthralgias in association with degenerative joint disease both of the appendicular as well as the axial system.  Her daughter has psoriasis without anyone else in the family on her side or her father's side.  She had decided not to go for duloxetine.  She is finding gabapentin continues to provide her with good pain relief.  She will take Tylenol as first thing in the morning.  Dose of gabapentin, coming from 300 to 200 mg daily she has not noted any difference.  She only takes it at bedtime.  She also takes acetaminophen sustained-release typically first thing in the morning..  She is able to do all her day-to-day activities, grocery shopping cooking 2 miles of walking a day she is continue to able to drive.  She  lives by herself in a senior living apartment complex.  She is not woken up from sleep because of the pain in any of the joints or back.       DETAILED EXAMINATION  05/20/24  :    Vitals:    05/20/24 1028   BP: (!) 142/66   BP Location: Left arm   Pulse: 57   SpO2: 96%   Weight: 60.4 kg (133 lb 1.6 oz)     Alert oriented. Head including the face is examined for malar rash, heliotropes, scarring, lupus pernio. Eyes examined for redness such as in episcleritis/scleritis, periorbital lesions.   Neck/ Face examined for parotid gland swelling, range of motion of neck.  Left upper and lower and right upper and lower extremities examined for tenderness, swelling, warmth of the appendicular joints, range of motion, edema, rash.  Some of the important findings included: she does not have evidence of synovitis in the palpable joints of the upper extremities.  No significant deformities of the digits.  no Heberden nodes.  Range of motion of the shoulders  show full abduction.  No JLT effusion or warmth of the knees.  she does not have dactylitis of the digits.     Patient Active Problem List    Diagnosis Date Noted    Primary osteoarthritis involving multiple joints 02/19/2024     Priority: Medium    Generalized Osteoarthritis Of The Hand      Priority: Medium     Created by Conversion  Replacement Utility updated for latest IMO load        Chest pain 02/27/2015     Priority: Medium    HTN (hypertension) 02/27/2015     Priority: Medium    Lipid disorder 02/27/2015     Priority: Medium    SANTOS (dyspnea on exertion) 02/27/2015     Priority: Medium    Diastolic dysfunction 02/27/2015     Priority: Medium    Ventricular hypokinesia 02/27/2015     Priority: Medium    Lumbar Spondylosis      Priority: Medium     Created by Conversion        Arthralgias In Multiple Sites      Priority: Medium     Created by Conversion        Myalgia And Myositis      Priority: Medium     Created by Conversion        Trochanteric Bursitis       "Priority: Medium     Created by Conversion         Past Surgical History:   Procedure Laterality Date    APPENDECTOMY      BACK SURGERY      BLADDER SURGERY      \"Lift\", three times    CATARACT EXTRACTION Bilateral     HYSTERECTOMY      OTHER SURGICAL HISTORY Bilateral     breast implants    TONSILLECTOMY        Past Medical History:   Diagnosis Date    Arthritis     Hypercholesteremia     Hypertension      Allergies   Allergen Reactions    Clindamycin Unknown    Dilaudid [Hydromorphone] Unknown    Morphine Unknown    Other Allergy (See Comments) [External Allergen Needs Reconciliation - See Comment] Unknown     Dilaudid JOANA, 08/11/2014.      Sulfa (Sulfonamide Antibiotics) [Sulfa Antibiotics] Unknown     Current Outpatient Medications   Medication Sig Dispense Refill    alendronate (FOSAMAX) 70 MG tablet Once weekly      amLODIPine (NORVASC) 2.5 MG tablet Take 2.5 mg by mouth daily      aspirin 81 MG EC tablet [ASPIRIN 81 MG EC TABLET] Take 81 mg by mouth daily.      atenolol (TENORMIN) 100 MG tablet Take 50 mg by mouth 2 times daily      atorvastatin (LIPITOR) 20 MG tablet [ATORVASTATIN (LIPITOR) 20 MG TABLET] Take 20 mg by mouth bedtime.      calcium, as carbonate, (OS-MYA) 500 mg calcium (1,250 mg) tablet [CALCIUM, AS CARBONATE, (OS-MYA) 500 MG CALCIUM (1,250 MG) TABLET] Take 2 tablets by mouth daily.      DENTAGEL 1.1 % Gel dental gel [DENTAGEL 1.1 % GEL DENTAL GEL] BRUSTH MOUTH WITH PASTE THREE TIMES A DAY  3    furosemide (LASIX) 40 MG tablet Take 40 mg by mouth daily      gabapentin (NEURONTIN) 100 MG capsule Take 2 capsules (200 mg) by mouth at bedtime for 90 days 180 capsule 0    losartan (COZAAR) 100 MG tablet Take 100 mg by mouth daily      mometasone (NASONEX) 50 mcg/actuation nasal spray [MOMETASONE (NASONEX) 50 MCG/ACTUATION NASAL SPRAY] use 2 sprays in each nostril once a day      spironolactone (ALDACTONE) 25 MG tablet Take 25 mg by mouth daily       family history includes Cerebrovascular Disease " in her father.  Social Connections: Unknown (1/1/2022)    [facility] [employer] [facility]    Social Connections     Frequency of Communication with Friends and Family: Not on file          WBC Count   Date Value Ref Range Status   02/23/2024 8.0 4.0 - 11.0 10e3/uL Final     RBC Count   Date Value Ref Range Status   02/23/2024 4.31 3.80 - 5.20 10e6/uL Final     Hemoglobin   Date Value Ref Range Status   02/23/2024 14.0 11.7 - 15.7 g/dL Final     Hematocrit   Date Value Ref Range Status   02/23/2024 41.8 35.0 - 47.0 % Final     MCV   Date Value Ref Range Status   02/23/2024 97 78 - 100 fL Final     MCH   Date Value Ref Range Status   02/23/2024 32.5 26.5 - 33.0 pg Final     Platelet Count   Date Value Ref Range Status   02/23/2024 256 150 - 450 10e3/uL Final     ALT   Date Value Ref Range Status   02/23/2024 19 0 - 50 U/L Final     Albumin   Date Value Ref Range Status   02/23/2024 4.0 3.5 - 5.2 g/dL Final   01/06/2022 3.7 3.5 - 5.0 g/dL Final     Creatinine   Date Value Ref Range Status   04/04/2024 0.82 0.51 - 0.95 mg/dL Final     GFR Estimate   Date Value Ref Range Status   04/04/2024 70 >60 mL/min/1.73m2 Final   05/19/2021 >60 >60 mL/min/1.73m2 Final     GFR Estimate If Black   Date Value Ref Range Status   05/19/2021 >60 >60 mL/min/1.73m2 Final

## 2024-08-01 DIAGNOSIS — M15.0 PRIMARY OSTEOARTHRITIS INVOLVING MULTIPLE JOINTS: ICD-10-CM

## 2024-08-01 DIAGNOSIS — M25.50 PAIN IN JOINT, MULTIPLE SITES: ICD-10-CM

## 2024-08-01 RX ORDER — GABAPENTIN 100 MG/1
100 CAPSULE ORAL AT BEDTIME
Qty: 30 CAPSULE | Refills: 0 | Status: SHIPPED | OUTPATIENT
Start: 2024-08-01 | End: 2024-08-21

## 2024-08-21 ENCOUNTER — OFFICE VISIT (OUTPATIENT)
Dept: RHEUMATOLOGY | Facility: CLINIC | Age: 84
End: 2024-08-21
Attending: INTERNAL MEDICINE
Payer: COMMERCIAL

## 2024-08-21 VITALS
BODY MASS INDEX: 21.45 KG/M2 | OXYGEN SATURATION: 96 % | HEART RATE: 58 BPM | DIASTOLIC BLOOD PRESSURE: 64 MMHG | WEIGHT: 128.9 LBS | SYSTOLIC BLOOD PRESSURE: 152 MMHG

## 2024-08-21 DIAGNOSIS — M15.0 PRIMARY OSTEOARTHRITIS INVOLVING MULTIPLE JOINTS: Primary | ICD-10-CM

## 2024-08-21 DIAGNOSIS — M25.50 PAIN IN JOINT, MULTIPLE SITES: ICD-10-CM

## 2024-08-21 DIAGNOSIS — M47.817 LUMBOSACRAL SPONDYLOSIS WITHOUT MYELOPATHY: ICD-10-CM

## 2024-08-21 PROCEDURE — G2211 COMPLEX E/M VISIT ADD ON: HCPCS | Performed by: INTERNAL MEDICINE

## 2024-08-21 PROCEDURE — 99214 OFFICE O/P EST MOD 30 MIN: CPT | Performed by: INTERNAL MEDICINE

## 2024-08-21 RX ORDER — GABAPENTIN 100 MG/1
200 CAPSULE ORAL AT BEDTIME
Qty: 180 CAPSULE | Refills: 0 | Status: SHIPPED | OUTPATIENT
Start: 2024-08-21 | End: 2024-11-19

## 2024-08-21 NOTE — PROGRESS NOTES
"      Rheumatology follow-up visit note     Ginna is a 83 year old female presents today for follow-up.    Ginna was seen today for recheck.    Diagnoses and all orders for this visit:    Primary osteoarthritis involving multiple joints  -     gabapentin (NEURONTIN) 100 MG capsule; Take 2 capsules (200 mg) by mouth at bedtime.    Pain in joint, multiple sites  -     gabapentin (NEURONTIN) 100 MG capsule; Take 2 capsules (200 mg) by mouth at bedtime.    Lumbosacral spondylosis without myelopathy        The longitudinal plan of care for the diagnosis(es)/condition(s) as documented were addressed during this visit. Due to the added complexity in care, I will continue to support Ginna in the subsequent management and with ongoing continuity of care.      Follow up in 3 months.    HPI    Ginna Pagan is a 83 year old female is here for follow-up of   polyarthralgias in association with degenerative joint disease both of the appendicular as well as the axial system.  Her daughter has psoriasis without anyone else in the family on her side or her father's side.  She had decided not to go for duloxetine.  On her previous visit she had wanted to drop the dose of gabapentin that she had been taking for quite some time at 200 mg at bedtime wondering if she still needs it.  This was done that has resulted in worsening of her joint symptoms this is \"all over, she has tried to put up with this for the past several weeks.  She would like to go back to 200 mg daily.   She is able to do all her day-to-day activities, grocery shopping cooking 2 miles of walking a day she is continue to able to drive.  She lives by herself in a senior living apartment complex.  She is not woken up from sleep because of the pain in any of the joints or back.      She also was concerned about her daughter who has had joint symptoms and \"x-rays were normal\" she is special needs 60-year-old person.     .         DETAILED EXAMINATION  08/21/24  " ":    Vitals:    08/21/24 1013   BP: (!) 152/64   BP Location: Right arm   Pulse: 58   SpO2: 96%   Weight: 58.5 kg (128 lb 14.4 oz)     Alert oriented. Head including the face is examined for malar rash, heliotropes, scarring, lupus pernio. Eyes examined for redness such as in episcleritis/scleritis, periorbital lesions.   Neck/ Face examined for parotid gland swelling, range of motion of neck.  Left upper and lower and right upper and lower extremities examined for tenderness, swelling, warmth of the appendicular joints, range of motion, edema, rash.  Some of the important findings included: she does not have evidence of synovitis in the palpable joints of the upper extremities.  No significant deformities of the digits.  + Heberden nodes.  Range of motion of the shoulders  show full abduction.  No JLT effusion or warmth of the knees.  she does not have dactylitis of the digits.     Patient Active Problem List    Diagnosis Date Noted    Primary osteoarthritis involving multiple joints 02/19/2024     Priority: Medium    Generalized Osteoarthritis Of The Hand      Priority: Medium     Created by Conversion  Replacement Utility updated for latest IMO load        Chest pain 02/27/2015     Priority: Medium    HTN (hypertension) 02/27/2015     Priority: Medium    Lipid disorder 02/27/2015     Priority: Medium    SANTOS (dyspnea on exertion) 02/27/2015     Priority: Medium    Diastolic dysfunction 02/27/2015     Priority: Medium    Ventricular hypokinesia 02/27/2015     Priority: Medium    Lumbar Spondylosis      Priority: Medium     Created by Conversion        Arthralgias In Multiple Sites      Priority: Medium     Created by Conversion        Myalgia And Myositis      Priority: Medium     Created by Conversion        Trochanteric Bursitis      Priority: Medium     Created by Conversion         Past Surgical History:   Procedure Laterality Date    APPENDECTOMY      BACK SURGERY      BLADDER SURGERY      \"Lift\", three times "    CATARACT EXTRACTION Bilateral     HYSTERECTOMY      OTHER SURGICAL HISTORY Bilateral     breast implants    TONSILLECTOMY        Past Medical History:   Diagnosis Date    Arthritis     Hypercholesteremia     Hypertension      Allergies   Allergen Reactions    Clindamycin Unknown    Dilaudid [Hydromorphone] Unknown    Morphine Unknown    Other Allergy (See Comments) [External Allergen Needs Reconciliation - See Comment] Unknown     Dilaudid JOANA, 08/11/2014.      Sulfa (Sulfonamide Antibiotics) [Sulfa Antibiotics] Unknown     Current Outpatient Medications   Medication Sig Dispense Refill    alendronate (FOSAMAX) 70 MG tablet Once weekly      amLODIPine (NORVASC) 2.5 MG tablet Take 2.5 mg by mouth daily      aspirin 81 MG EC tablet [ASPIRIN 81 MG EC TABLET] Take 81 mg by mouth daily.      atenolol (TENORMIN) 100 MG tablet Take 50 mg by mouth 2 times daily      atorvastatin (LIPITOR) 20 MG tablet [ATORVASTATIN (LIPITOR) 20 MG TABLET] Take 20 mg by mouth bedtime.      calcium, as carbonate, (OS-MYA) 500 mg calcium (1,250 mg) tablet [CALCIUM, AS CARBONATE, (OS-MYA) 500 MG CALCIUM (1,250 MG) TABLET] Take 2 tablets by mouth daily.      DENTAGEL 1.1 % Gel dental gel [DENTAGEL 1.1 % GEL DENTAL GEL] BRUSTH MOUTH WITH PASTE THREE TIMES A DAY  3    furosemide (LASIX) 40 MG tablet Take 40 mg by mouth daily      gabapentin (NEURONTIN) 100 MG capsule Take 1 capsule (100 mg) by mouth at bedtime for 30 days 30 capsule 0    losartan (COZAAR) 100 MG tablet Take 100 mg by mouth daily      mometasone (NASONEX) 50 mcg/actuation nasal spray [MOMETASONE (NASONEX) 50 MCG/ACTUATION NASAL SPRAY] use 2 sprays in each nostril once a day      spironolactone (ALDACTONE) 25 MG tablet Take 25 mg by mouth daily       family history includes Cerebrovascular Disease in her father.  Social Connections: Unknown (1/1/2022)    [facility] [employer]    Social Connections      Frequency of Communication with Friends and Family: Not on file          WBC Count   Date Value Ref Range Status   02/23/2024 8.0 4.0 - 11.0 10e3/uL Final     RBC Count   Date Value Ref Range Status   02/23/2024 4.31 3.80 - 5.20 10e6/uL Final     Hemoglobin   Date Value Ref Range Status   02/23/2024 14.0 11.7 - 15.7 g/dL Final     Hematocrit   Date Value Ref Range Status   02/23/2024 41.8 35.0 - 47.0 % Final     MCV   Date Value Ref Range Status   02/23/2024 97 78 - 100 fL Final     MCH   Date Value Ref Range Status   02/23/2024 32.5 26.5 - 33.0 pg Final     Platelet Count   Date Value Ref Range Status   02/23/2024 256 150 - 450 10e3/uL Final     ALT   Date Value Ref Range Status   02/23/2024 19 0 - 50 U/L Final     Albumin   Date Value Ref Range Status   02/23/2024 4.0 3.5 - 5.2 g/dL Final   01/06/2022 3.7 3.5 - 5.0 g/dL Final     Creatinine   Date Value Ref Range Status   04/04/2024 0.82 0.51 - 0.95 mg/dL Final     GFR Estimate   Date Value Ref Range Status   04/04/2024 70 >60 mL/min/1.73m2 Final   05/19/2021 >60 >60 mL/min/1.73m2 Final     GFR Estimate If Black   Date Value Ref Range Status   05/19/2021 >60 >60 mL/min/1.73m2 Final

## 2024-09-03 DIAGNOSIS — M15.0 PRIMARY OSTEOARTHRITIS INVOLVING MULTIPLE JOINTS: ICD-10-CM

## 2024-09-03 DIAGNOSIS — M25.50 PAIN IN JOINT, MULTIPLE SITES: ICD-10-CM

## 2024-09-03 RX ORDER — GABAPENTIN 100 MG/1
100 CAPSULE ORAL AT BEDTIME
Qty: 30 CAPSULE | Refills: 0 | OUTPATIENT
Start: 2024-09-03

## 2024-10-19 ENCOUNTER — HEALTH MAINTENANCE LETTER (OUTPATIENT)
Age: 84
End: 2024-10-19

## 2024-11-21 ENCOUNTER — OFFICE VISIT (OUTPATIENT)
Dept: RHEUMATOLOGY | Facility: CLINIC | Age: 84
End: 2024-11-21
Payer: COMMERCIAL

## 2024-11-21 VITALS
SYSTOLIC BLOOD PRESSURE: 160 MMHG | WEIGHT: 125.5 LBS | BODY MASS INDEX: 20.88 KG/M2 | DIASTOLIC BLOOD PRESSURE: 74 MMHG | HEART RATE: 63 BPM | OXYGEN SATURATION: 95 %

## 2024-11-21 DIAGNOSIS — M15.0 PRIMARY OSTEOARTHRITIS INVOLVING MULTIPLE JOINTS: Primary | ICD-10-CM

## 2024-11-21 DIAGNOSIS — M47.817 LUMBOSACRAL SPONDYLOSIS WITHOUT MYELOPATHY: ICD-10-CM

## 2024-11-21 DIAGNOSIS — M25.50 PAIN IN JOINT, MULTIPLE SITES: ICD-10-CM

## 2024-11-21 RX ORDER — GABAPENTIN 100 MG/1
200 CAPSULE ORAL AT BEDTIME
Qty: 180 CAPSULE | Refills: 1 | Status: SHIPPED | OUTPATIENT
Start: 2024-11-21

## 2024-11-21 NOTE — PROGRESS NOTES
Rheumatology follow-up visit note     Ginna is a 84 year old female presents today for follow-up.    Ginna was seen today for recheck.    Diagnoses and all orders for this visit:    Primary osteoarthritis involving multiple joints  -     gabapentin (NEURONTIN) 100 MG capsule; Take 2 capsules (200 mg) by mouth at bedtime.    Lumbosacral spondylosis without myelopathy    Arthralgias In Multiple Sites  -     gabapentin (NEURONTIN) 100 MG capsule; Take 2 capsules (200 mg) by mouth at bedtime.  -     Albumin level; Standing  -     ALT; Standing  -     CBC with platelets; Standing  -     Creatinine; Standing        The longitudinal plan of care for the diagnosis(es)/condition(s) as documented were addressed during this visit. Due to the added complexity in care, I will continue to support Ginna in the subsequent management and with ongoing continuity of care.      Follow up in 6 months.    HPI    Ginna Pagan is a 84 year old female is here for follow-up of   polyarthralgias in association with degenerative joint disease both of the appendicular as well as the axial system.  Her daughter has psoriasis without anyone else in the family on her side or her father's side.  She had decided not to go for duloxetine.    She is able to do all her day-to-day activities, grocery shopping cooking 2 miles of walking a day she is continue to able to drive.  She lives by herself in a senior living apartment complex.  She is not woken up from sleep because of the pain in any of the joints or back.    She had higher blood pressure reading during the visit, she checks her blood pressure at home regularly and has noted that each time she visits doctors office her blood pressure does go up and then back to normal at home.  She is already on antihypertensives.  She does not take ibuprofen or any other nonsteroidals.      DETAILED EXAMINATION  11/21/24  :    Vitals:    11/21/24 1016 11/21/24 1018   BP: (!) 143/61 (!) 160/74  "  BP Location: Right arm Left arm   Pulse: 63    SpO2: 95%    Weight: 56.9 kg (125 lb 8 oz)      Alert oriented. Head including the face is examined for malar rash, heliotropes, scarring, lupus pernio. Eyes examined for redness such as in episcleritis/scleritis, periorbital lesions.   Neck/ Face examined for parotid gland swelling, range of motion of neck.  Left upper and lower and right upper and lower extremities examined for tenderness, swelling, warmth of the appendicular joints, range of motion, edema, rash.  Some of the important findings included: she does not have evidence of synovitis in the palpable joints of the upper extremities.  No significant deformities of the digits.  + Heberden nodes.  Marked squaring of the first CMC's.  Range of motion of the shoulders  show full abduction.  No JLT effusion or warmth of the knees.  she does not have dactylitis of the digits.     Patient Active Problem List    Diagnosis Date Noted    Primary osteoarthritis involving multiple joints 02/19/2024     Priority: Medium    Generalized Osteoarthritis Of The Hand      Priority: Medium     Created by Conversion  Replacement Utility updated for latest IMO load        Chest pain 02/27/2015     Priority: Medium    HTN (hypertension) 02/27/2015     Priority: Medium    Lipid disorder 02/27/2015     Priority: Medium    SANTOS (dyspnea on exertion) 02/27/2015     Priority: Medium    Diastolic dysfunction 02/27/2015     Priority: Medium    Ventricular hypokinesia 02/27/2015     Priority: Medium    Lumbar Spondylosis      Priority: Medium     Created by Conversion        Arthralgias In Multiple Sites      Priority: Medium     Created by Conversion        Myalgia And Myositis      Priority: Medium     Created by Conversion        Trochanteric Bursitis      Priority: Medium     Created by Conversion         Past Surgical History:   Procedure Laterality Date    APPENDECTOMY      BACK SURGERY      BLADDER SURGERY      \"Lift\", three times "    CATARACT EXTRACTION Bilateral     HYSTERECTOMY      OTHER SURGICAL HISTORY Bilateral     breast implants    TONSILLECTOMY        Past Medical History:   Diagnosis Date    Arthritis     Hypercholesteremia     Hypertension      Allergies   Allergen Reactions    Clindamycin Unknown    Dilaudid [Hydromorphone] Unknown    Morphine Unknown    Other Allergy (See Comments) [External Allergen Needs Reconciliation - See Comment] Unknown     Dilaudid JOANA, 08/11/2014.      Sulfa (Sulfonamide Antibiotics) [Sulfa Antibiotics] Unknown     Current Outpatient Medications   Medication Sig Dispense Refill    alendronate (FOSAMAX) 70 MG tablet Once weekly      amLODIPine (NORVASC) 2.5 MG tablet Take 2.5 mg by mouth daily      aspirin 81 MG EC tablet [ASPIRIN 81 MG EC TABLET] Take 81 mg by mouth daily.      atenolol (TENORMIN) 100 MG tablet Take 50 mg by mouth 2 times daily      atorvastatin (LIPITOR) 20 MG tablet [ATORVASTATIN (LIPITOR) 20 MG TABLET] Take 20 mg by mouth bedtime.      calcium, as carbonate, (OS-MYA) 500 mg calcium (1,250 mg) tablet [CALCIUM, AS CARBONATE, (OS-MYA) 500 MG CALCIUM (1,250 MG) TABLET] Take 2 tablets by mouth daily.      DENTAGEL 1.1 % Gel dental gel [DENTAGEL 1.1 % GEL DENTAL GEL] BRUSTH MOUTH WITH PASTE THREE TIMES A DAY  3    furosemide (LASIX) 40 MG tablet Take 40 mg by mouth daily      gabapentin (NEURONTIN) 100 MG capsule Take 2 capsules (200 mg) by mouth at bedtime. 180 capsule 0    losartan (COZAAR) 100 MG tablet Take 100 mg by mouth daily      mometasone (NASONEX) 50 mcg/actuation nasal spray [MOMETASONE (NASONEX) 50 MCG/ACTUATION NASAL SPRAY] use 2 sprays in each nostril once a day      spironolactone (ALDACTONE) 25 MG tablet Take 25 mg by mouth daily       family history includes Cerebrovascular Disease in her father.  Social Connections: Unknown (1/1/2022)    Received from [employer], [employer] [facility]    Social Connections      Frequency of Communication with Friends and Family: Not on file          WBC Count   Date Value Ref Range Status   02/23/2024 8.0 4.0 - 11.0 10e3/uL Final     RBC Count   Date Value Ref Range Status   02/23/2024 4.31 3.80 - 5.20 10e6/uL Final     Hemoglobin   Date Value Ref Range Status   02/23/2024 14.0 11.7 - 15.7 g/dL Final     Hematocrit   Date Value Ref Range Status   02/23/2024 41.8 35.0 - 47.0 % Final     MCV   Date Value Ref Range Status   02/23/2024 97 78 - 100 fL Final     MCH   Date Value Ref Range Status   02/23/2024 32.5 26.5 - 33.0 pg Final     Platelet Count   Date Value Ref Range Status   02/23/2024 256 150 - 450 10e3/uL Final     ALT   Date Value Ref Range Status   02/23/2024 19 0 - 50 U/L Final     Albumin   Date Value Ref Range Status   02/23/2024 4.0 3.5 - 5.2 g/dL Final   01/06/2022 3.7 3.5 - 5.0 g/dL Final     Creatinine   Date Value Ref Range Status   04/04/2024 0.82 0.51 - 0.95 mg/dL Final     GFR Estimate   Date Value Ref Range Status   04/04/2024 70 >60 mL/min/1.73m2 Final   05/19/2021 >60 >60 mL/min/1.73m2 Final     GFR Estimate If Black   Date Value Ref Range Status   05/19/2021 >60 >60 mL/min/1.73m2 Final

## 2024-12-03 ENCOUNTER — LAB REQUISITION (OUTPATIENT)
Dept: LAB | Facility: CLINIC | Age: 84
End: 2024-12-03

## 2024-12-03 DIAGNOSIS — R63.4 ABNORMAL WEIGHT LOSS: ICD-10-CM

## 2024-12-03 LAB — TSH SERPL DL<=0.005 MIU/L-ACNC: 5.78 UIU/ML (ref 0.3–4.2)

## 2024-12-03 PROCEDURE — 84443 ASSAY THYROID STIM HORMONE: CPT | Performed by: FAMILY MEDICINE

## 2024-12-11 ENCOUNTER — LAB REQUISITION (OUTPATIENT)
Dept: LAB | Facility: CLINIC | Age: 84
End: 2024-12-11

## 2024-12-11 DIAGNOSIS — E78.2 MIXED HYPERLIPIDEMIA: ICD-10-CM

## 2024-12-11 LAB
CHOLEST SERPL-MCNC: 115 MG/DL
FASTING STATUS PATIENT QL REPORTED: ABNORMAL
HDLC SERPL-MCNC: 49 MG/DL
LDLC SERPL CALC-MCNC: 49 MG/DL
NONHDLC SERPL-MCNC: 66 MG/DL
TRIGL SERPL-MCNC: 87 MG/DL

## 2024-12-11 PROCEDURE — 83718 ASSAY OF LIPOPROTEIN: CPT | Performed by: FAMILY MEDICINE

## 2024-12-11 PROCEDURE — 80061 LIPID PANEL: CPT | Performed by: FAMILY MEDICINE

## 2024-12-11 PROCEDURE — 82465 ASSAY BLD/SERUM CHOLESTEROL: CPT | Performed by: FAMILY MEDICINE

## 2024-12-12 ENCOUNTER — LAB REQUISITION (OUTPATIENT)
Dept: LAB | Facility: CLINIC | Age: 84
End: 2024-12-12

## 2024-12-12 DIAGNOSIS — K52.9 NONINFECTIVE GASTROENTERITIS AND COLITIS, UNSPECIFIED: ICD-10-CM

## 2024-12-12 LAB

## 2024-12-12 PROCEDURE — 87493 C DIFF AMPLIFIED PROBE: CPT | Performed by: FAMILY MEDICINE

## 2024-12-12 PROCEDURE — 87507 IADNA-DNA/RNA PROBE TQ 12-25: CPT | Performed by: FAMILY MEDICINE

## 2025-01-08 ENCOUNTER — LAB REQUISITION (OUTPATIENT)
Dept: LAB | Facility: CLINIC | Age: 85
End: 2025-01-08

## 2025-01-08 DIAGNOSIS — R79.89 OTHER SPECIFIED ABNORMAL FINDINGS OF BLOOD CHEMISTRY: ICD-10-CM

## 2025-01-08 LAB — TSH SERPL DL<=0.005 MIU/L-ACNC: 2.2 UIU/ML (ref 0.3–4.2)

## 2025-01-08 PROCEDURE — 84443 ASSAY THYROID STIM HORMONE: CPT | Performed by: FAMILY MEDICINE

## 2025-02-25 ENCOUNTER — VIRTUAL VISIT (OUTPATIENT)
Dept: PHARMACY | Facility: PHYSICIAN GROUP | Age: 85
End: 2025-02-25
Payer: COMMERCIAL

## 2025-02-25 DIAGNOSIS — J30.2 SEASONAL ALLERGIC RHINITIS, UNSPECIFIED TRIGGER: ICD-10-CM

## 2025-02-25 DIAGNOSIS — I10 HYPERTENSION, ESSENTIAL: Primary | ICD-10-CM

## 2025-02-25 DIAGNOSIS — R32 URINARY INCONTINENCE, UNSPECIFIED TYPE: ICD-10-CM

## 2025-02-25 DIAGNOSIS — M81.0 OSTEOPOROSIS WITHOUT CURRENT PATHOLOGICAL FRACTURE, UNSPECIFIED OSTEOPOROSIS TYPE: ICD-10-CM

## 2025-02-25 DIAGNOSIS — E78.5 HYPERLIPIDEMIA, UNSPECIFIED HYPERLIPIDEMIA TYPE: ICD-10-CM

## 2025-02-25 DIAGNOSIS — I25.10 CORONARY ARTERY DISEASE INVOLVING NATIVE HEART WITHOUT ANGINA PECTORIS, UNSPECIFIED VESSEL OR LESION TYPE: ICD-10-CM

## 2025-02-25 DIAGNOSIS — K52.9 CHRONIC DIARRHEA: ICD-10-CM

## 2025-02-25 DIAGNOSIS — R52 PAIN: ICD-10-CM

## 2025-02-25 PROCEDURE — 99607 MTMS BY PHARM ADDL 15 MIN: CPT | Mod: 93 | Performed by: PHARMACIST

## 2025-02-25 PROCEDURE — 99605 MTMS BY PHARM NP 15 MIN: CPT | Mod: 93 | Performed by: PHARMACIST

## 2025-02-25 RX ORDER — POLYETHYLENE GLYCOL 3350 17 G/17G
1 POWDER, FOR SOLUTION ORAL DAILY
COMMUNITY

## 2025-02-25 RX ORDER — VIBEGRON 75 MG/1
75 TABLET, FILM COATED ORAL DAILY
COMMUNITY
Start: 2025-02-01

## 2025-02-25 NOTE — LETTER
"Recommended To-Do List      Prepared on: Feb 25, 2025       You can get the best results from your medications by completing the items on this \"To-Do List.\"      Bring your To-Do List when you go to your doctor. And, share it with your family or caregivers.    My To-Do List:  What we talked about: What I should do:   The importance of taking your medication as intended    Education: You can hold Miralax on days you feel like you are going too much            What we talked about: What I should do:    What my medicines are for, how to know if my medicines are working, made sure my medicines are safe for me and reviewed how to take my medicines.      Take my medicines every day                "

## 2025-02-25 NOTE — LETTER
February 25, 2025  Ginna Pagan  [address]  [place] [address]    Dear Ms. Pagan, [facility]     Thank you for talking with me on Feb 25, 2025 about your health and medications. As a follow-up to our conversation, I have included two documents:      Your Recommended To-Do List has steps you should take to get the best results from your medications.  Your Medication List will help you keep track of your medications and how to take them.    If you want to talk about these documents, please call Lisbet Zhu RPH at phone: [phone], Monday-Friday 8-4:30pm.    I look forward to working with you and your doctors to make sure your medications work well for you.    Sincerely,  Lisbet Zhu RPH  [facility] Pharmacist, [facility]

## 2025-02-25 NOTE — Clinical Note
_  Medication List        Prepared on: Feb 25, 2025     Bring your Medication List when you go to the doctor, hospital, or   emergency room. And, share it with your family or caregivers.     Note any changes to how you take your medications.  Cross out medications when you no longer use them.    Medication How I take it Why I use it Prescriber   alendronate (FOSAMAX) 70 MG tablet Once weekly   Patient Reported   amLODIPine (NORVASC) 2.5 MG tablet Take 2.5 mg by mouth daily   Patient Reported   aspirin 81 MG EC tablet [ASPIRIN 81 MG EC TABLET] Take 81 mg by mouth daily.   Historical Provider   atenolol (TENORMIN) 100 MG tablet Take 50 mg by mouth 2 times daily High Blood Pressure Savannah Wen MD   atorvastatin (LIPITOR) 20 MG tablet [ATORVASTATIN (LIPITOR) 20 MG TABLET] Take 20 mg by mouth bedtime.   Historical Provider   calcium, as carbonate, (OS-MYA) 500 mg calcium (1,250 mg) tablet Take 2 tablets by mouth daily. bone health Historical Provider   DENTAGEL 1.1 % Gel dental gel Apply 1 applicator to affected area at bedtime. Tooth Decay Patient Reported   furosemide (LASIX) 40 MG tablet Take 40 mg by mouth daily High Blood Pressure Savannah Wen MD   gabapentin (NEURONTIN) 100 MG capsule Take 2 capsules (200 mg) by mouth at bedtime. Primary osteoarthritis involving multiple joints; Pain in Joint, Multiple Sites MARY Curry   GEMTESA 75 MG TABS tablet Take 75 mg by mouth daily. Urinary Incontinence Hannah Arreguin MD   losartan (COZAAR) 100 MG tablet Take 100 mg by mouth daily High Blood Pressure Savannah Wen MD   mometasone (NASONEX) 50 mcg/actuation nasal spray Spray 2 sprays into both nostrils daily. Hayfever Savannah Wen MD   polyethylene glycol (MIRALAX) 17 GM/Dose powder Take 1 Capful by mouth daily. Constipation Patient Reported   psyllium (METAMUCIL/KONSYL) 58.6 % powder Take 1 Tablespoonful by mouth daily. Constipation Patient Reported   spironolactone (ALDACTONE) 25 MG tablet Take 25 mg by  mouth daily High Blood Pressure Savannah Wen MD         Add new medications, over-the-counter drugs, herbals, vitamins, or  minerals in the blank rows below.    Medication How I take it Why I use it Prescriber                                      Allergies:      - Clindamycin   Unknown  - Dilaudid [hydromorphone]   Unknown  - Morphine   Unknown  - Other Allergy (see Comments) [external Allergen Needs Reconciliation - See Comment]   Unknown  - Sulfa (sulfonamide Antibiotics) [sulfa Antibiotics]   Unknown        Side effects I have had:      Not on File        Other Information:              My notes and questions:

## 2025-02-25 NOTE — LETTER
_  Medication List        Prepared on: Feb 25, 2025     Bring your Medication List when you go to the doctor, hospital, or   emergency room. And, share it with your family or caregivers.     Note any changes to how you take your medications.  Cross out medications when you no longer use them.    Medication How I take it Why I use it Prescriber   alendronate (FOSAMAX) 70 MG tablet Take 70 mg by mouth every 7 days. Once weekly Osteoporosis Savannah Wen MD   amLODIPine (NORVASC) 2.5 MG tablet Take 2.5 mg by mouth daily. High Blood Pressure Savannah Wen MD   aspirin 81 MG EC tablet Take 81 mg by mouth daily. Disease involving Lipid Deposits in the Arteries Savannah Wen MD   atenolol (TENORMIN) 100 MG tablet Take 50 mg by mouth 2 times daily High Blood Pressure Savannah Wen MD   atorvastatin (LIPITOR) 20 MG tablet Take 20 mg by mouth at bedtime. High Amount of Fats in the Blood Savannah Wen MD   calcium, as carbonate, (OS-MYA) 500 mg calcium (1,250 mg) tablet Take 2 tablets by mouth daily. bone health Self   DENTAGEL 1.1 % Gel dental gel Apply 1 applicator to affected area at bedtime. Tooth Decay   SANKET TOBIAS      furosemide (LASIX) 40 MG tablet Take 40 mg by mouth daily High Blood Pressure Savannah Wen MD   gabapentin (NEURONTIN) 100 MG capsule Take 2 capsules (200 mg) by mouth at bedtime. Primary osteoarthritis involving multiple joints; Pain in Joint, Multiple Sites MARY Curry   GEMTESA 75 MG TABS tablet Take 75 mg by mouth daily. Urinary Incontinence Hannah Arreguin MD   losartan (COZAAR) 100 MG tablet Take 100 mg by mouth daily High Blood Pressure Savannah Wen MD   mometasone (NASONEX) 50 mcg/actuation nasal spray Spray 2 sprays into both nostrils daily. Hayfever Savannah Wen MD   polyethylene glycol (MIRALAX) 17 GM/Dose powder Take 1 Capful by mouth daily. Constipation Self   psyllium (METAMUCIL/KONSYL) 58.6 % powder Take 1 Tablespoonful by mouth daily. Constipation Self    spironolactone (ALDACTONE) 25 MG tablet Take 25 mg by mouth daily High Blood Pressure Savannah Wen MD         Add new medications, over-the-counter drugs, herbals, vitamins, or  minerals in the blank rows below.    Medication How I take it Why I use it Prescriber                                      Allergies:      - Clindamycin - Unknown  - Dilaudid [hydromorphone] - Unknown  - Morphine - Unknown  - Other Allergy (see Comments) [external Allergen Needs Reconciliation - See Comment] - Unknown  - Sulfa (sulfonamide Antibiotics) [sulfa Antibiotics] - Unknown        Side effects I have had:      Not on File        Other Information:              My notes and questions:

## 2025-02-25 NOTE — PROGRESS NOTES
Medication Therapy Management (MTM) Encounter    ASSESSMENT:                            Medication Adherence/Access: No issues identified    Hypertension:   Patient is meeting blood pressure goal of < 140/90mmHg.     Hyperlipidemia/CAD   Stable. Last LDL at goal <70    Allergic Rhinitis:   Stable.    Osteoporosis:   Stable. Meeting RDI of Vitamin D and calcium.     Osteoarthritis:    Stable.    Chronic diarrhea  Reasonable to hold miralax when needed.     Incontinence  Managed by urology. Did encourage pelvic floor therapy as nonpharmacologic management option\    PLAN:                            Continue current medications    Follow-up: yearly or sooner with questions     SUBJECTIVE/OBJECTIVE:                          Ginna Pagan is a 84 year old female coming in for an annual medication review visit. She was referred to me from Savannah Wen.     Reason for visit: Med Review    Allergies/ADRs: Reviewed in chart  Past Medical History: Reviewed in chart  Tobacco: She reports that she has quit smoking. She has never used smokeless tobacco.  Alcohol: Less than 1 beverage / month  Caffeine: 1/2 caf 4 cups - sometimes drinks the whole amount    Medication Adherence/Access: no issues reported    Hypertension   -Amlodipine 2.5mg QAM - higher doses caused swelling  -Atenolol 50mg twice daily  -Losartan 100mg QAM  -Furosemide 40mg QAM  -spironolactone 25 mg daily     Patient reports no current medication side effects. Seems to have less urination with spironolactone compared to former combination pill   Patient self monitors blood pressure.  Home BP monitoring systolic 130-140s .  Has verified her readings with the clinic readings        Hyperlipidemia /CAD  -Atorvastatin 20mg at bedtime   -aspirin 81 mg daily     Patient reports no significant myalgias or other side effects.       Allergic Rhinitis:   -loratadine 10 mg once daily  -Mometasone nasal spray 50mcg 2 sprays in each nostril daily    Patient reports no  current medication side effects.       Osteoporosis:   -alendronate (Fosamax) 70mg weekly (has been on current therapy 3 years)  -calcium 1000 mg daily    Patient is not experiencing side effects.  Risk factors: post-menopausal  Last vitamin D level:  Lab Results   Component Value Date    VITDT 46 09/14/2022     Osteoarthritis:    -Gabapentin 200mg at bedtime   -Acetaminophen 500mg 2 tablets every morning  -Acetaminophen + diphenhydramine 500/25mg at bedtime     Patient reports the following side effects:  Denies Side Effects.  Follows with rheumatology- Dr Tamika [facility]   Wakes up stiff/achy in the morning - acetaminophen helps with this     Chronic diarrhea  -Miralax 17 g daily   -Metamucil 1 scoop daily  Does thinking she sometimes has days she is going too much, wondering if she can hold miralax    Incontinence  -Gemtesa 75 mg once every morning   seeing [facility] - Hannah Arreguin   They recommended pelvic floor therapy- she wants to think about this. Does feel like Gemtesa helped a little bit   Did have bladder prolapse surgery x 2 but no longer interested in invasive therapies    Today's Vitals: There were no vitals taken for this visit.  ----------------      I spent 20 minutes with this patient today. All changes were made via collaborative practice agreement with Savannah Wen MD.     A summary of these recommendations was given to the patient.    Lisbet Zhu, Pharm.D.  Medication Therapy Management Pharmacist      Telemedicine Visit Details  The patient's medications can be safely assessed via a telemedicine encounter.  Type of service:  Telephone visit  Originating Location (pt. Location): Home    Distant Location (provider location):  On-site  Start Time: 1:01 PM  End Time: 1:21 PM       Medication Therapy Recommendations  Chronic diarrhea   1 Current Medication: polyethylene glycol (MIRALAX) 17 GM/Dose powder   Current Medication Sig: Take 1 Capful by mouth daily.   Rationale: Does  not understand instructions - Adherence - Adherence   Recommendation: Provide Education   Status: Patient Agreed - Adherence/Education   Identified Date: 2/25/2025 Completed Date: 2/25/2025

## 2025-03-22 ENCOUNTER — HEALTH MAINTENANCE LETTER (OUTPATIENT)
Age: 85
End: 2025-03-22

## 2025-05-19 ENCOUNTER — LAB (OUTPATIENT)
Dept: LAB | Facility: CLINIC | Age: 85
End: 2025-05-19
Payer: COMMERCIAL

## 2025-05-19 DIAGNOSIS — M25.50 PAIN IN JOINT, MULTIPLE SITES: ICD-10-CM

## 2025-05-19 LAB
ALBUMIN SERPL BCG-MCNC: 4 G/DL (ref 3.5–5.2)
ALT SERPL W P-5'-P-CCNC: 15 U/L (ref 0–50)
CREAT SERPL-MCNC: 0.96 MG/DL (ref 0.51–0.95)
EGFRCR SERPLBLD CKD-EPI 2021: 57 ML/MIN/1.73M2
ERYTHROCYTE [DISTWIDTH] IN BLOOD BY AUTOMATED COUNT: 12.3 % (ref 10–15)
HCT VFR BLD AUTO: 39.1 % (ref 35–47)
HGB BLD-MCNC: 13 G/DL (ref 11.7–15.7)
MCH RBC QN AUTO: 32.4 PG (ref 26.5–33)
MCHC RBC AUTO-ENTMCNC: 33.2 G/DL (ref 31.5–36.5)
MCV RBC AUTO: 98 FL (ref 78–100)
PLATELET # BLD AUTO: 306 10E3/UL (ref 150–450)
RBC # BLD AUTO: 4.01 10E6/UL (ref 3.8–5.2)
WBC # BLD AUTO: 9.9 10E3/UL (ref 4–11)

## 2025-05-19 PROCEDURE — 82565 ASSAY OF CREATININE: CPT

## 2025-05-19 PROCEDURE — 36415 COLL VENOUS BLD VENIPUNCTURE: CPT

## 2025-05-19 PROCEDURE — 85027 COMPLETE CBC AUTOMATED: CPT

## 2025-05-19 PROCEDURE — 82040 ASSAY OF SERUM ALBUMIN: CPT

## 2025-05-19 PROCEDURE — 84460 ALANINE AMINO (ALT) (SGPT): CPT

## 2025-05-20 ENCOUNTER — RESULTS FOLLOW-UP (OUTPATIENT)
Dept: RHEUMATOLOGY | Facility: CLINIC | Age: 85
End: 2025-05-20

## 2025-05-21 ENCOUNTER — OFFICE VISIT (OUTPATIENT)
Dept: RHEUMATOLOGY | Facility: CLINIC | Age: 85
End: 2025-05-21
Payer: COMMERCIAL

## 2025-05-21 VITALS
OXYGEN SATURATION: 99 % | BODY MASS INDEX: 20.85 KG/M2 | WEIGHT: 125.3 LBS | SYSTOLIC BLOOD PRESSURE: 132 MMHG | HEART RATE: 54 BPM | DIASTOLIC BLOOD PRESSURE: 56 MMHG

## 2025-05-21 DIAGNOSIS — M15.0 PRIMARY OSTEOARTHRITIS INVOLVING MULTIPLE JOINTS: Primary | ICD-10-CM

## 2025-05-21 DIAGNOSIS — M47.817 LUMBOSACRAL SPONDYLOSIS WITHOUT MYELOPATHY: ICD-10-CM

## 2025-05-21 DIAGNOSIS — M25.50 PAIN IN JOINT, MULTIPLE SITES: ICD-10-CM

## 2025-05-21 PROCEDURE — 3075F SYST BP GE 130 - 139MM HG: CPT | Performed by: INTERNAL MEDICINE

## 2025-05-21 PROCEDURE — 3078F DIAST BP <80 MM HG: CPT | Performed by: INTERNAL MEDICINE

## 2025-05-21 PROCEDURE — G2211 COMPLEX E/M VISIT ADD ON: HCPCS | Performed by: INTERNAL MEDICINE

## 2025-05-21 PROCEDURE — 99214 OFFICE O/P EST MOD 30 MIN: CPT | Performed by: INTERNAL MEDICINE

## 2025-05-21 RX ORDER — GABAPENTIN 100 MG/1
200 CAPSULE ORAL AT BEDTIME
Qty: 180 CAPSULE | Refills: 1 | Status: SHIPPED | OUTPATIENT
Start: 2025-05-21

## 2025-05-21 NOTE — PROGRESS NOTES
Rheumatology follow-up visit note     Ginna is a 84 year old female presents today for follow-up.    Ginna was seen today for recheck.    Diagnoses and all orders for this visit:    Primary osteoarthritis involving multiple joints  -     gabapentin (NEURONTIN) 100 MG capsule; Take 2 capsules (200 mg) by mouth at bedtime.    Lumbosacral spondylosis without myelopathy    Arthralgias In Multiple Sites  -     gabapentin (NEURONTIN) 100 MG capsule; Take 2 capsules (200 mg) by mouth at bedtime.        The longitudinal plan of care for the diagnosis(es)/condition(s) as documented were addressed during this visit. Due to the added complexity in care, I will continue to support Ginna in the subsequent management and with ongoing continuity of care.      Follow up in 6 months.    HPI    Ginna Pagan is a 84 year old female is here for follow-up of   polyarthralgias in association with degenerative joint disease both of the appendicular as well as the axial system.  Her daughter has psoriasis without anyone else in the family on her side or her father's side.  She had decided not to go for duloxetine.    She is able to do all her day-to-day activities, grocery shopping cooking 2 miles of walking a day she is continue to able to drive.  She lives by herself in a senior living apartment complex.  She is not woken up from sleep because of the pain in any of the joints or back.   Recent labs reviewed drop in GFR noted.  She does not take ibuprofen or any other nonsteroidals.  Takes acetaminophen 2 in the morning 2 at bedtime.           DETAILED EXAMINATION  05/21/25  :    Vitals:    05/21/25 1007   BP: 132/56   BP Location: Right arm   Pulse: 54   SpO2: 99%   Weight: 56.8 kg (125 lb 4.8 oz)     Alert oriented. Head including the face is examined for malar rash, heliotropes, scarring, lupus pernio. Eyes examined for redness such as in episcleritis/scleritis, periorbital lesions.   Neck/ Face examined for parotid  "gland swelling, range of motion of neck.  Left upper and lower and right upper and lower extremities examined for tenderness, swelling, warmth of the appendicular joints, range of motion, edema, rash.  Some of the important findings included: she does not have evidence of synovitis in the palpable joints of the upper extremities.  No significant deformities of the digits.  + [place]ber[place] nodes.  Range of motion of the shoulders  show full abduction.  No JLT effusion or warmth of the knees.  she does not have dactylitis of the digits.     Patient Active Problem List    Diagnosis Date Noted    Primary osteoarthritis involving multiple joints 02/19/2024     Priority: Medium    Generalized Osteoarthritis Of The Hand      Priority: Medium     Created by Conversion  Replacement Utility updated for latest IMO load        Chest pain 02/27/2015     Priority: Medium    HTN (hypertension) 02/27/2015     Priority: Medium    Lipid disorder 02/27/2015     Priority: Medium    SANTOS (dyspnea on exertion) 02/27/2015     Priority: Medium    Diastolic dysfunction 02/27/2015     Priority: Medium    Ventricular hypokinesia 02/27/2015     Priority: Medium    Lumbar Spondylosis      Priority: Medium     Created by Conversion        Arthralgias In Multiple Sites      Priority: Medium     Created by Conversion        Myalgia And Myositis      Priority: Medium     Created by Conversion        Trochanteric Bursitis      Priority: Medium     Created by Conversion         Past Surgical History:   Procedure Laterality Date    APPENDECTOMY      BACK SURGERY      BLADDER SURGERY      \"Lift\", three times    CATARACT EXTRACTION Bilateral     HYSTERECTOMY      OTHER SURGICAL HISTORY Bilateral     breast implants    TONSILLECTOMY        Past Medical History:   Diagnosis Date    Arthritis     Hypercholesteremia     Hypertension      Allergies   Allergen Reactions    Clindamycin Unknown    Dilaudid [Hydromorphone] Unknown    Morphine Unknown    Other Allergy " (See Comments) [External Allergen Needs Reconciliation - See Comment] Unknown     Cali BERNARD, 08/11/2014.      Sulfa (Sulfonamide Antibiotics) [Sulfa Antibiotics] Unknown     Current Outpatient Medications   Medication Sig Dispense Refill    alendronate (FOSAMAX) 70 MG tablet Take 70 mg by mouth every 7 days. Once weekly      amLODIPine (NORVASC) 2.5 MG tablet Take 2.5 mg by mouth daily.      aspirin 81 MG EC tablet Take 81 mg by mouth daily.      atenolol (TENORMIN) 100 MG tablet Take 50 mg by mouth 2 times daily      atorvastatin (LIPITOR) 20 MG tablet Take 20 mg by mouth at bedtime.      calcium, as carbonate, (OS-MYA) 500 mg calcium (1,250 mg) tablet Take 2 tablets by mouth daily.      DENTAGEL 1.1 % Gel dental gel Apply 1 applicator to affected area at bedtime.  3    furosemide (LASIX) 40 MG tablet Take 40 mg by mouth daily      gabapentin (NEURONTIN) 100 MG capsule Take 2 capsules (200 mg) by mouth at bedtime. 180 capsule 1    GEMTESA 75 MG TABS tablet Take 75 mg by mouth daily.      losartan (COZAAR) 100 MG tablet Take 100 mg by mouth daily      mometasone (NASONEX) 50 mcg/actuation nasal spray Spray 2 sprays into both nostrils daily.      polyethylene glycol (MIRALAX) 17 GM/Dose powder Take 1 Capful by mouth daily.      psyllium (METAMUCIL/KONSYL) 58.6 % powder Take 1 Tablespoonful by mouth daily.      spironolactone (ALDACTONE) 25 MG tablet Take 25 mg by mouth daily       family history includes Cerebrovascular Disease in her father.  Social Connections: Unknown (1/1/2022)    Received from [facility]    Social Connections     Frequency of Communication with Friends and Family: Not on file          WBC Count   Date Value Ref Range Status   05/19/2025 9.9 4.0 - 11.0 10e3/uL Final     RBC Count   Date Value Ref Range Status   05/19/2025 4.01 3.80 - 5.20 10e6/uL Final     Hemoglobin   Date Value Ref Range Status   05/19/2025 13.0 11.7 - 15.7 g/dL Final     Hematocrit   Date  Value Ref Range Status   05/19/2025 39.1 35.0 - 47.0 % Final     MCV   Date Value Ref Range Status   05/19/2025 98 78 - 100 fL Final     MCH   Date Value Ref Range Status   05/19/2025 32.4 26.5 - 33.0 pg Final     Platelet Count   Date Value Ref Range Status   05/19/2025 306 150 - 450 10e3/uL Final     ALT   Date Value Ref Range Status   05/19/2025 15 0 - 50 U/L Final     Albumin   Date Value Ref Range Status   05/19/2025 4.0 3.5 - 5.2 g/dL Final   01/06/2022 3.7 3.5 - 5.0 g/dL Final     Creatinine   Date Value Ref Range Status   05/19/2025 0.96 (H) 0.51 - 0.95 mg/dL Final     GFR Estimate   Date Value Ref Range Status   05/19/2025 57 (L) >60 mL/min/1.73m2 Final     Comment:     eGFR calculated using 2021 CKD-EPI equation.   05/19/2021 >60 >60 mL/min/1.73m2 Final     GFR Estimate If Black   Date Value Ref Range Status   05/19/2021 >60 >60 mL/min/1.73m2 Final